# Patient Record
Sex: FEMALE | Race: BLACK OR AFRICAN AMERICAN | NOT HISPANIC OR LATINO | Employment: FULL TIME | URBAN - METROPOLITAN AREA
[De-identification: names, ages, dates, MRNs, and addresses within clinical notes are randomized per-mention and may not be internally consistent; named-entity substitution may affect disease eponyms.]

---

## 2024-06-21 ENCOUNTER — APPOINTMENT (OUTPATIENT)
Dept: PRIMARY CARE | Facility: CLINIC | Age: 69
End: 2024-06-21
Payer: MEDICARE

## 2024-06-21 ENCOUNTER — HOSPITAL ENCOUNTER (OUTPATIENT)
Dept: RADIOLOGY | Facility: HOSPITAL | Age: 69
Discharge: HOME | End: 2024-06-21
Payer: MEDICARE

## 2024-06-21 ENCOUNTER — LAB (OUTPATIENT)
Dept: LAB | Facility: LAB | Age: 69
End: 2024-06-21
Payer: MEDICARE

## 2024-06-21 VITALS
OXYGEN SATURATION: 99 % | HEIGHT: 62 IN | WEIGHT: 293 LBS | SYSTOLIC BLOOD PRESSURE: 147 MMHG | DIASTOLIC BLOOD PRESSURE: 88 MMHG | BODY MASS INDEX: 53.92 KG/M2 | HEART RATE: 72 BPM | RESPIRATION RATE: 22 BRPM

## 2024-06-21 DIAGNOSIS — R42 DIZZINESS: ICD-10-CM

## 2024-06-21 DIAGNOSIS — R90.89 ABNORMAL BRAIN CT: ICD-10-CM

## 2024-06-21 DIAGNOSIS — R31.9 HEMATURIA, UNSPECIFIED TYPE: ICD-10-CM
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
## 2024-06-21 DIAGNOSIS — Z12.11 SCREENING FOR COLORECTAL CANCER: ICD-10-CM

## 2024-06-21 DIAGNOSIS — N93.9 ABNORMAL UTERINE BLEEDING: ICD-10-CM

## 2024-06-21 DIAGNOSIS — Z76.89 ENCOUNTER TO ESTABLISH CARE: Primary | ICD-10-CM

## 2024-06-21 DIAGNOSIS — Z76.89 ENCOUNTER TO ESTABLISH CARE: ICD-10-CM

## 2024-06-21 DIAGNOSIS — Z12.31 SCREENING MAMMOGRAM FOR BREAST CANCER: ICD-10-CM

## 2024-06-21 DIAGNOSIS — S06.0X0A BRAIN CONCUSSION, WITHOUT LOSS OF CONSCIOUSNESS, INITIAL ENCOUNTER: ICD-10-CM

## 2024-06-21 DIAGNOSIS — R03.0 ELEVATED BP WITHOUT DIAGNOSIS OF HYPERTENSION: ICD-10-CM

## 2024-06-21 DIAGNOSIS — J01.00 ACUTE NON-RECURRENT MAXILLARY SINUSITIS: ICD-10-CM

## 2024-06-21 DIAGNOSIS — Z12.12 SCREENING FOR COLORECTAL CANCER: ICD-10-CM

## 2024-06-21 PROBLEM — E66.813 CLASS 3 SEVERE OBESITY DUE TO EXCESS CALORIES WITH SERIOUS COMORBIDITY IN ADULT: Status: ACTIVE | Noted: 2018-05-07

## 2024-06-21 PROBLEM — E44.1 MALNUTRITION OF MILD DEGREE (MULTI): Status: ACTIVE | Noted: 2022-05-26

## 2024-06-21 PROBLEM — E66.01 CLASS 3 SEVERE OBESITY DUE TO EXCESS CALORIES WITH SERIOUS COMORBIDITY IN ADULT (MULTI): Status: ACTIVE | Noted: 2018-05-07

## 2024-06-21 PROBLEM — K80.00 CALCULUS OF GALLBLADDER WITH ACUTE CHOLECYSTITIS WITHOUT OBSTRUCTION: Status: ACTIVE | Noted: 2022-04-26

## 2024-06-21 PROBLEM — K80.00 ACUTE CHOLECYSTITIS DUE TO BILIARY CALCULUS: Status: ACTIVE | Noted: 2022-05-25

## 2024-06-21 PROBLEM — S06.0XAA BRAIN CONCUSSION: Status: ACTIVE | Noted: 2024-06-21

## 2024-06-21 LAB
ALBUMIN SERPL BCP-MCNC: 3.8 G/DL (ref 3.4–5)
ALP SERPL-CCNC: 110 U/L (ref 33–136)
ALT SERPL W P-5'-P-CCNC: 12 U/L (ref 7–45)
ANION GAP SERPL CALC-SCNC: 11 MMOL/L (ref 10–20)
APPEARANCE UR: ABNORMAL
AST SERPL W P-5'-P-CCNC: 14 U/L (ref 9–39)
BACTERIA #/AREA URNS AUTO: ABNORMAL /HPF
BASOPHILS # BLD AUTO: 0.03 X10*3/UL (ref 0–0.1)
BASOPHILS NFR BLD AUTO: 0.4 %
BILIRUB SERPL-MCNC: 1.1 MG/DL (ref 0–1.2)
BILIRUB UR STRIP.AUTO-MCNC: NEGATIVE MG/DL
BUN SERPL-MCNC: 12 MG/DL (ref 6–23)
CALCIUM SERPL-MCNC: 8.8 MG/DL (ref 8.6–10.3)
CHLORIDE SERPL-SCNC: 107 MMOL/L (ref 98–107)
CHOLEST SERPL-MCNC: 207 MG/DL (ref 0–199)
CHOLESTEROL/HDL RATIO: 4.8
CO2 SERPL-SCNC: 26 MMOL/L (ref 21–32)
COLOR UR: YELLOW
CREAT SERPL-MCNC: 0.8 MG/DL (ref 0.5–1.05)
EGFRCR SERPLBLD CKD-EPI 2021: 81 ML/MIN/1.73M*2
EOSINOPHIL # BLD AUTO: 0.17 X10*3/UL (ref 0–0.7)
EOSINOPHIL NFR BLD AUTO: 2 %
ERYTHROCYTE [DISTWIDTH] IN BLOOD BY AUTOMATED COUNT: 14.3 % (ref 11.5–14.5)
EST. AVERAGE GLUCOSE BLD GHB EST-MCNC: 120 MG/DL
GLUCOSE SERPL-MCNC: 93 MG/DL (ref 74–99)
GLUCOSE UR STRIP.AUTO-MCNC: NORMAL MG/DL
HBA1C MFR BLD: 5.8 %
HCT VFR BLD AUTO: 41 % (ref 36–46)
HDLC SERPL-MCNC: 43.3 MG/DL
HGB BLD-MCNC: 13 G/DL (ref 12–16)
IMM GRANULOCYTES # BLD AUTO: 0.04 X10*3/UL (ref 0–0.7)
IMM GRANULOCYTES NFR BLD AUTO: 0.5 % (ref 0–0.9)
KETONES UR STRIP.AUTO-MCNC: ABNORMAL MG/DL
LEUKOCYTE ESTERASE UR QL STRIP.AUTO: ABNORMAL
LYMPHOCYTES # BLD AUTO: 2.26 X10*3/UL (ref 1.2–4.8)
LYMPHOCYTES NFR BLD AUTO: 26.5 %
MCH RBC QN AUTO: 31.2 PG (ref 26–34)
MCHC RBC AUTO-ENTMCNC: 31.7 G/DL (ref 32–36)
MCV RBC AUTO: 98 FL (ref 80–100)
MONOCYTES # BLD AUTO: 0.61 X10*3/UL (ref 0.1–1)
MONOCYTES NFR BLD AUTO: 7.2 %
MUCOUS THREADS #/AREA URNS AUTO: ABNORMAL /LPF
NEUTROPHILS # BLD AUTO: 5.42 X10*3/UL (ref 1.2–7.7)
NEUTROPHILS NFR BLD AUTO: 63.4 %
NITRITE UR QL STRIP.AUTO: NEGATIVE
NON-HDL CHOLESTEROL: 164 MG/DL (ref 0–149)
NRBC BLD-RTO: 0 /100 WBCS (ref 0–0)
PH UR STRIP.AUTO: 5 [PH]
PLATELET # BLD AUTO: 320 X10*3/UL (ref 150–450)
POTASSIUM SERPL-SCNC: 4.1 MMOL/L (ref 3.5–5.3)
PROT SERPL-MCNC: 7.1 G/DL (ref 6.4–8.2)
PROT UR STRIP.AUTO-MCNC: ABNORMAL MG/DL
RBC # BLD AUTO: 4.17 X10*6/UL (ref 4–5.2)
RBC # UR STRIP.AUTO: ABNORMAL /UL
RBC #/AREA URNS AUTO: ABNORMAL /HPF
SODIUM SERPL-SCNC: 140 MMOL/L (ref 136–145)
SP GR UR STRIP.AUTO: 1.02
SQUAMOUS #/AREA URNS AUTO: ABNORMAL /HPF
TSH SERPL-ACNC: 2 MIU/L (ref 0.44–3.98)
UROBILINOGEN UR STRIP.AUTO-MCNC: NORMAL MG/DL
WBC # BLD AUTO: 8.5 X10*3/UL (ref 4.4–11.3)
WBC #/AREA URNS AUTO: ABNORMAL /HPF

## 2024-06-21 PROCEDURE — 80053 COMPREHEN METABOLIC PANEL: CPT

## 2024-06-21 PROCEDURE — 81001 URINALYSIS AUTO W/SCOPE: CPT

## 2024-06-21 PROCEDURE — 1159F MED LIST DOCD IN RCRD: CPT

## 2024-06-21 PROCEDURE — 83718 ASSAY OF LIPOPROTEIN: CPT

## 2024-06-21 PROCEDURE — 85025 COMPLETE CBC W/AUTO DIFF WBC: CPT

## 2024-06-21 PROCEDURE — 82465 ASSAY BLD/SERUM CHOLESTEROL: CPT

## 2024-06-21 PROCEDURE — 3008F BODY MASS INDEX DOCD: CPT

## 2024-06-21 PROCEDURE — 1036F TOBACCO NON-USER: CPT

## 2024-06-21 PROCEDURE — 70150 X-RAY EXAM OF FACIAL BONES: CPT

## 2024-06-21 PROCEDURE — 70450 CT HEAD/BRAIN W/O DYE: CPT

## 2024-06-21 PROCEDURE — 1170F FXNL STATUS ASSESSED: CPT

## 2024-06-21 PROCEDURE — 84443 ASSAY THYROID STIM HORMONE: CPT

## 2024-06-21 PROCEDURE — 99205 OFFICE O/P NEW HI 60 MIN: CPT

## 2024-06-21 RX ORDER — CIPROFLOXACIN 500 MG/1
500 TABLET ORAL 2 TIMES DAILY
Qty: 10 TABLET | Refills: 0 | Status: SHIPPED | OUTPATIENT
Start: 2024-06-21 | End: 2024-06-26

## 2024-06-21 ASSESSMENT — ENCOUNTER SYMPTOMS
FEVER: 0
PALPITATIONS: 0
RECTAL PAIN: 0
HEADACHES: 1
SINUS PAIN: 1
CARDIOVASCULAR NEGATIVE: 1
DEPRESSION: 0
CONSTITUTIONAL NEGATIVE: 1
DIFFICULTY URINATING: 0
UNEXPECTED WEIGHT CHANGE: 0
TREMORS: 0
VOICE CHANGE: 0
RESPIRATORY NEGATIVE: 1
CONSTIPATION: 0
HEMATURIA: 0
BACK PAIN: 0
ABDOMINAL DISTENTION: 0
FATIGUE: 0
ENDOCRINE NEGATIVE: 1
ACTIVITY CHANGE: 0
CHILLS: 0
DYSPHORIC MOOD: 0
LOSS OF SENSATION IN FEET: 0
SPEECH DIFFICULTY: 0
COUGH: 0
JOINT SWELLING: 0
RHINORRHEA: 0
EYE DISCHARGE: 0
SHORTNESS OF BREATH: 0
FACIAL ASYMMETRY: 0
OCCASIONAL FEELINGS OF UNSTEADINESS: 1
AGITATION: 0
SEIZURES: 0
EYES NEGATIVE: 1
FREQUENCY: 0
COLOR CHANGE: 0
NAUSEA: 0
SINUS PRESSURE: 1
WEAKNESS: 0
GASTROINTESTINAL NEGATIVE: 1
APPETITE CHANGE: 0
POLYDIPSIA: 0
PSYCHIATRIC NEGATIVE: 1
PHOTOPHOBIA: 0
NECK PAIN: 0
CHEST TIGHTNESS: 0
FLANK PAIN: 0
ABDOMINAL PAIN: 0
HYPERACTIVE: 0
WHEEZING: 0
DIARRHEA: 0
BLOOD IN STOOL: 0
SLEEP DISTURBANCE: 0
DYSURIA: 0
BRUISES/BLEEDS EASILY: 0
STRIDOR: 0
HEMATOLOGIC/LYMPHATIC NEGATIVE: 1
NUMBNESS: 0
APNEA: 0
NECK STIFFNESS: 0
ANAL BLEEDING: 0
DIAPHORESIS: 0
VOMITING: 0
POLYPHAGIA: 0
TROUBLE SWALLOWING: 0
LIGHT-HEADEDNESS: 0
CONFUSION: 0
ARTHRALGIAS: 1
SORE THROAT: 0
DIZZINESS: 1
NERVOUS/ANXIOUS: 0
MYALGIAS: 0

## 2024-06-21 ASSESSMENT — ACTIVITIES OF DAILY LIVING (ADL)
JUDGMENT_ADEQUATE_SAFELY_COMPLETE_DAILY_ACTIVITIES: YES
GROOMING: INDEPENDENT
WALKS IN HOME: INDEPENDENT
PATIENT'S MEMORY ADEQUATE TO SAFELY COMPLETE DAILY ACTIVITIES?: YES
HEARING - RIGHT EAR: FUNCTIONAL
DRESSING YOURSELF: INDEPENDENT
FEEDING YOURSELF: INDEPENDENT
HEARING - LEFT EAR: FUNCTIONAL
TOILETING: INDEPENDENT
BATHING: INDEPENDENT
ADEQUATE_TO_COMPLETE_ADL: YES

## 2024-06-21 ASSESSMENT — PATIENT HEALTH QUESTIONNAIRE - PHQ9
SUM OF ALL RESPONSES TO PHQ9 QUESTIONS 1 AND 2: 0
1. LITTLE INTEREST OR PLEASURE IN DOING THINGS: NOT AT ALL
2. FEELING DOWN, DEPRESSED OR HOPELESS: NOT AT ALL

## 2024-06-21 NOTE — PROGRESS NOTES
Primary Care Provider: Marleni Espinoza, APRN-CNP    Subjective   Flaquita Watson is a 69 y.o. female who presents for Establish Care and Vaginal Bleeding.    NPV/ est care    PMH:  BMI 60  Hx of CHOLECYSTOSTOMY    Has been having vaginal bleeding/spotting for the last month  Last pap test she thinks was about 11 years ago  Fell 1.5 weeks ago, she reports she was walking and at the time was wearing a long dress and tripped over her dress and fell; she reports she fell and hit the right side of her body and her head; denies any loss of consciousness ; sinus pressure and pain  No vision changes, no blurry vision, no nausea, no vomiting, no numbness, no tingling, no weakness, no memory changes  + HA and dizziness since she fell- Reports that she has never had a headache so this is not normal for her  No blood thinners  No chest pain, no SOB, no racing HR  Arthralgias   Mammogram due  Colorectal cancer screening due- declines colonoscopy         Review of Systems   Constitutional: Negative.  Negative for activity change, appetite change, chills, diaphoresis, fatigue, fever and unexpected weight change.   HENT:  Positive for sinus pressure and sinus pain. Negative for congestion, dental problem, ear discharge, ear pain, hearing loss, mouth sores, nosebleeds, postnasal drip, rhinorrhea, sneezing, sore throat, tinnitus, trouble swallowing and voice change.    Eyes: Negative.  Negative for photophobia, discharge and visual disturbance.   Respiratory: Negative.  Negative for apnea, cough, chest tightness, shortness of breath, wheezing and stridor.    Cardiovascular: Negative.  Negative for chest pain, palpitations and leg swelling.   Gastrointestinal: Negative.  Negative for abdominal distention, abdominal pain, anal bleeding, blood in stool, constipation, diarrhea, nausea, rectal pain and vomiting.   Endocrine: Negative.  Negative for cold intolerance, heat intolerance, polydipsia, polyphagia and polyuria.   Genitourinary:  "Negative.  Negative for decreased urine volume, difficulty urinating, dysuria, flank pain, frequency, hematuria and urgency.   Musculoskeletal:  Positive for arthralgias. Negative for back pain, gait problem, joint swelling, myalgias, neck pain and neck stiffness.   Skin: Negative.  Negative for color change and rash.   Neurological:  Positive for dizziness and headaches. Negative for tremors, seizures, syncope, facial asymmetry, speech difficulty, weakness, light-headedness and numbness.   Hematological: Negative.  Does not bruise/bleed easily.   Psychiatric/Behavioral: Negative.  Negative for agitation, confusion, dysphoric mood, sleep disturbance and suicidal ideas. The patient is not nervous/anxious and is not hyperactive.    All other systems reviewed and are negative.        Objective   /88 (BP Location: Right arm, Patient Position: Sitting, BP Cuff Size: Adult)   Pulse 72   Resp 22   Ht 1.585 m (5' 2.4\")   Wt (!) 153 kg (337 lb 9.6 oz)   SpO2 99%   BMI 60.95 kg/m²     Physical Exam  Vitals reviewed.   Constitutional:       General: She is not in acute distress.     Appearance: Normal appearance. She is obese. She is not ill-appearing, toxic-appearing or diaphoretic.   HENT:      Head: Normocephalic and atraumatic.      Nose: Nose normal.   Eyes:      Extraocular Movements: Extraocular movements intact.      Conjunctiva/sclera: Conjunctivae normal.      Pupils: Pupils are equal, round, and reactive to light.   Cardiovascular:      Rate and Rhythm: Normal rate and regular rhythm.      Pulses: Normal pulses.      Heart sounds: Normal heart sounds. No murmur heard.     No friction rub. No gallop.   Pulmonary:      Effort: Pulmonary effort is normal. No respiratory distress.      Breath sounds: Normal breath sounds.   Abdominal:      General: Abdomen is flat. Bowel sounds are normal.      Palpations: Abdomen is soft.   Musculoskeletal:         General: Normal range of motion.      Cervical back: Normal " range of motion and neck supple.   Lymphadenopathy:      Cervical: No cervical adenopathy.   Skin:     General: Skin is warm and dry.      Capillary Refill: Capillary refill takes less than 2 seconds.   Neurological:      General: No focal deficit present.      Mental Status: She is alert and oriented to person, place, and time. Mental status is at baseline.   Psychiatric:         Mood and Affect: Mood normal.         Behavior: Behavior normal.         Thought Content: Thought content normal.         Judgment: Judgment normal.         Assessment/Plan   Problem List Items Addressed This Visit    NPV/ est care   Hematuria    new  Relevant Orders    Urinalysis with Reflex Microscopic    Urine Culture    CBC and Auto Differential    Comprehensive metabolic panel    Abnormal uterine bleeding    new  Relevant Orders    US pelvis transvaginal    TSH with reflex to Free T4 if abnormal    Urinalysis with Reflex Microscopic    Urine Culture    CBC and Auto Differential    Comprehensive metabolic panel    Hemoglobin A1C    Lipid Panel Non-Fasting    Referral to Obstetrics / Gynecology    Encounter to establish care - Primary    Relevant Orders    TSH with reflex to Free T4 if abnormal    Urinalysis with Reflex Microscopic    Urine Culture    CBC and Auto Differential    Comprehensive metabolic panel    Hemoglobin A1C    Lipid Panel Non-Fasting    Referral to Obstetrics / Gynecology    BMI 60.0-69.9, adult (Multi)    Work on healthier eating and start walking  Relevant Orders    TSH with reflex to Free T4 if abnormal    Urinalysis with Reflex Microscopic    Urine Culture    CBC and Auto Differential    Comprehensive metabolic panel    Hemoglobin A1C    Lipid Panel Non-Fasting    Brain concussion    Relevant Orders    CT head wo IV contrast    XR facial bones 1-2 views    Elevated BP without diagnosis of hypertension  New  Check BP a few times a week  Low sodium diet    Dizziness    Relevant Orders    CT head wo IV contrast     XR facial bones 1-2 views    Fall    Relevant Orders    CT head wo IV contrast    XR facial bones 1-2 views     Due to her weight and her reported symptoms would like to complete a CT head without contrast and XR of her facial bones.     Patient was identified as a fall risk. Risk prevention instructions provided.    Discussed red flags and when to go to the ER or call 911    Follow up in 2 weeks or sooner if needed      Addendum:  I called patient and left a voicemail letting her know there was nothing acute and that I wanted her to start on an antibiotic to cover for sinus infection and UTI- the urine culture is still pending. No fractures or bleeding seen on the CT scan. There was some changes on her CT head that should be looked at closer- unsure sure if the changes are part of the aging process or something else. I ordered a MRI of her brain, she can complete this at her convenience. Waiting on A1c and urine culture to result- some blood in the urine. Referral to urology is urine culture comes back normal. Can complete the abx for acute sinusitis seen on CT.

## 2024-06-24 ENCOUNTER — LAB (OUTPATIENT)
Dept: LAB | Facility: LAB | Age: 69
End: 2024-06-24
Payer: MEDICARE

## 2024-06-24 ENCOUNTER — TELEPHONE (OUTPATIENT)
Dept: PRIMARY CARE | Facility: CLINIC | Age: 69
End: 2024-06-24
Payer: MEDICARE

## 2024-06-24 DIAGNOSIS — R31.9 HEMATURIA, UNSPECIFIED TYPE: ICD-10-CM

## 2024-06-24 DIAGNOSIS — Z76.89 ENCOUNTER TO ESTABLISH CARE: ICD-10-CM

## 2024-06-24 DIAGNOSIS — N93.9 ABNORMAL UTERINE BLEEDING: ICD-10-CM

## 2024-06-24 PROCEDURE — 87086 URINE CULTURE/COLONY COUNT: CPT

## 2024-06-24 NOTE — TELEPHONE ENCOUNTER
Per Marleni Espinoza, APRN-CNP... Informed Patient there was nothing acute and that I wanted her to start on an antibiotic to cover for sinus infection and UTI- the urine culture is still pending.     make sure she was able to take the antibiotic and let her know that there was no fractures or bleeding seen on the CT scan. There was some changes on her CT head that should be looked at closer- unsure sure if the changes are part of the aging process or something else. I ordered a MRI of her brain, she can complete this at her convenience. Waiting on A1c and urine culture to result- some blood in the urine. Referral to urology is urine culture comes back normal. Can complete the abx for acute sinusitis seen on CT. Elevated cholesterol, start walking, increase fiber in diet, cut back on red meats, sweets, and cheese. Please schedule a 2 week follow up apt    Pt understood.

## 2024-06-26 ENCOUNTER — HOSPITAL ENCOUNTER (OUTPATIENT)
Dept: RADIOLOGY | Facility: CLINIC | Age: 69
Discharge: HOME | End: 2024-06-26
Payer: MEDICARE

## 2024-06-26 DIAGNOSIS — N93.9 ABNORMAL UTERINE BLEEDING: ICD-10-CM

## 2024-06-26 LAB — BACTERIA UR CULT: NO GROWTH

## 2024-06-26 PROCEDURE — 76856 US EXAM PELVIC COMPLETE: CPT

## 2024-06-26 PROCEDURE — 76830 TRANSVAGINAL US NON-OB: CPT | Performed by: RADIOLOGY

## 2024-06-26 PROCEDURE — 76856 US EXAM PELVIC COMPLETE: CPT | Performed by: RADIOLOGY

## 2024-06-28 DIAGNOSIS — N93.9 ABNORMAL UTERINE BLEEDING: ICD-10-CM

## 2024-06-28 DIAGNOSIS — R93.89 ENDOMETRIAL THICKENING ON ULTRASOUND: Primary | ICD-10-CM

## 2024-07-06 ENCOUNTER — HOSPITAL ENCOUNTER (OUTPATIENT)
Dept: RADIOLOGY | Facility: HOSPITAL | Age: 69
Discharge: HOME | End: 2024-07-06
Payer: MEDICARE

## 2024-07-06 VITALS — WEIGHT: 293 LBS | HEIGHT: 61 IN | BODY MASS INDEX: 55.32 KG/M2

## 2024-07-06 DIAGNOSIS — Z12.31 SCREENING MAMMOGRAM FOR BREAST CANCER: ICD-10-CM

## 2024-07-06 PROCEDURE — 77067 SCR MAMMO BI INCL CAD: CPT

## 2024-07-06 PROCEDURE — 77067 SCR MAMMO BI INCL CAD: CPT | Performed by: STUDENT IN AN ORGANIZED HEALTH CARE EDUCATION/TRAINING PROGRAM

## 2024-07-06 PROCEDURE — 77063 BREAST TOMOSYNTHESIS BI: CPT | Performed by: STUDENT IN AN ORGANIZED HEALTH CARE EDUCATION/TRAINING PROGRAM

## 2024-07-09 ENCOUNTER — HOSPITAL ENCOUNTER (OUTPATIENT)
Dept: RADIOLOGY | Facility: EXTERNAL LOCATION | Age: 69
Discharge: HOME | End: 2024-07-09

## 2024-07-10 LAB — NONINV COLON CA DNA+OCC BLD SCRN STL QL: NEGATIVE

## 2024-07-11 ENCOUNTER — APPOINTMENT (OUTPATIENT)
Dept: RADIOLOGY | Facility: HOSPITAL | Age: 69
End: 2024-07-11
Payer: MEDICARE

## 2024-07-18 ENCOUNTER — OFFICE VISIT (OUTPATIENT)
Dept: OBSTETRICS AND GYNECOLOGY | Facility: CLINIC | Age: 69
End: 2024-07-18
Payer: MEDICARE

## 2024-07-18 VITALS
SYSTOLIC BLOOD PRESSURE: 122 MMHG | HEIGHT: 64 IN | DIASTOLIC BLOOD PRESSURE: 84 MMHG | BODY MASS INDEX: 50.02 KG/M2 | WEIGHT: 293 LBS

## 2024-07-18 DIAGNOSIS — R93.89 ENDOMETRIAL THICKENING ON ULTRASOUND: ICD-10-CM

## 2024-07-18 DIAGNOSIS — N93.9 ABNORMAL UTERINE BLEEDING: ICD-10-CM

## 2024-07-18 PROCEDURE — 3008F BODY MASS INDEX DOCD: CPT | Performed by: OBSTETRICS & GYNECOLOGY

## 2024-07-18 PROCEDURE — 1160F RVW MEDS BY RX/DR IN RCRD: CPT | Performed by: OBSTETRICS & GYNECOLOGY

## 2024-07-18 PROCEDURE — 1036F TOBACCO NON-USER: CPT | Performed by: OBSTETRICS & GYNECOLOGY

## 2024-07-18 PROCEDURE — 99204 OFFICE O/P NEW MOD 45 MIN: CPT | Performed by: OBSTETRICS & GYNECOLOGY

## 2024-07-18 PROCEDURE — 87624 HPV HI-RISK TYP POOLED RSLT: CPT | Performed by: OBSTETRICS & GYNECOLOGY

## 2024-07-18 PROCEDURE — 1159F MED LIST DOCD IN RCRD: CPT | Performed by: OBSTETRICS & GYNECOLOGY

## 2024-07-18 PROCEDURE — 1126F AMNT PAIN NOTED NONE PRSNT: CPT | Performed by: OBSTETRICS & GYNECOLOGY

## 2024-07-18 PROCEDURE — 99214 OFFICE O/P EST MOD 30 MIN: CPT | Performed by: OBSTETRICS & GYNECOLOGY

## 2024-07-18 RX ORDER — MISOPROSTOL 200 UG/1
200 TABLET ORAL ONCE
Qty: 1 TABLET | Refills: 0 | Status: SHIPPED | OUTPATIENT
Start: 2024-07-18 | End: 2024-07-18

## 2024-07-18 ASSESSMENT — PAIN SCALES - GENERAL: PAINLEVEL: 0-NO PAIN

## 2024-07-18 ASSESSMENT — PATIENT HEALTH QUESTIONNAIRE - PHQ9
SUM OF ALL RESPONSES TO PHQ9 QUESTIONS 1 AND 2: 0
2. FEELING DOWN, DEPRESSED OR HOPELESS: NOT AT ALL
1. LITTLE INTEREST OR PLEASURE IN DOING THINGS: NOT AT ALL

## 2024-07-18 ASSESSMENT — ENCOUNTER SYMPTOMS
LOSS OF SENSATION IN FEET: 0
OCCASIONAL FEELINGS OF UNSTEADINESS: 0
DEPRESSION: 0

## 2024-07-18 NOTE — PROGRESS NOTES
Flaquita Watson is a 69 y.o. female,  who presented with  AUB    Spotting   No Cramping  No Pressure      Family history of breast and bladder cancer     US : increase endometrial thickness  1. Abnormally thickened endometrium measuring up to 2 cm. Further  gynecologic workup is advised.  2. Bilateral ovaries are not visualized. No adnexal masses are seen.      Obstetrical History:  OB History          2    Para   2    Term   2            AB        Living             SAB        IAB        Ectopic        Multiple        Live Births                   Vaginal        Gynecological history:  Menarche: 15   years old   Periods menopause            Vaginal discharge    No  Menopausal symptoms No        Last Pap:  History of abnormal pap exams? Yes      History reviewed. No pertinent past medical history.  History reviewed. No pertinent surgical history.  Family History   Problem Relation Name Age of Onset    COPD Mother      Stomach cancer Mother      Other (ESRD) Mother      Breast cancer Mother  46    Hypertension Mother      Thyroid disease Father      Heart failure Father      Other (Bladder Cancer) Sister  52    Diabetes type II Daughter       Social History     Tobacco Use    Smoking status: Never    Smokeless tobacco: Never   Vaping Use    Vaping status: Never Used   Substance Use Topics    Alcohol use: Never    Drug use: Never     Social History     Tobacco Use   Smoking Status Never   Smokeless Tobacco Never       Current Outpatient Medications on File Prior to Visit   Medication Sig Dispense Refill    albuterol 90 mcg/actuation inhaler Inhale 2 puffs every 4 hours if needed.      fluticasone (Flonase) 50 mcg/actuation nasal spray SPRAY 1 SPRAY INTO EACH NOSTRIL AT BEDTIME      [DISCONTINUED] methylPREDNISolone (Medrol Dospak) 4 mg tablets TAKE 6 TABLETS ON DAY 1 AS DIRECTED ON PACKAGE AND DECREASE BY 1 TAB EACH DAY FOR A TOTAL OF 6 DAYS       No current facility-administered medications on file  "prior to visit.     Allergies   Allergen Reactions    Iodine Itching    Penicillins Other    Sulfa (Sulfonamide Antibiotics) Other         REVIEW OF SYSTEMS:    General: No weight loss, malaise or fevers or other constitutional symptoms.  Neuro: No history of TIA's, stroke,.  No neurological symptoms or problems. No visual changes  Respiratory: No history of respiratory/pulmonary symptoms or problems.  Cardiovascular: No history of cardiovascular symptoms or problems.  GI: No history of GI symptoms or problems.   : No history of UTI in past 6 weeks.  No history of  symptoms or problems.  BREASTS: No skin dimpling, nipple discharge or masses.  Endocrine: No history of diabetes. No Thyroid symptoms  Hematology: No history of bleeding or clotting disorder.  Skin: Negative for lesions, rash, and itching.      PHYSICAL EXAMINATION:    /84   Ht 1.619 m (5' 3.75\")   Wt (!) 153 kg (337 lb 12.8 oz)   BMI 58.44 kg/m²   GENERAL: pleasant, female in no apparent distress  HEENT: Normocephalic, atraumatic, mucus membranes moist and no lesions  NEURO: alert and oriented x3,exam grossly non-focal  NECK: Supple, full range of motion, no adenopathy and thyroid normal  DERMATOLOGY: Normal, without lesions, non-icteric and non-hirsute       ABDOMEN: soft, non-tender and no masses  PELVIC: external genitalia atrophic , normal Bartholin's glands, urethra, [place]'s glands, no vulvar lesions, no cervical lesions, good vaginal support, physiologic discharge present, normal appearing perineal body and perianal region  BIMANUAL: uterus normal size, shape and consistency, no adnexal masses and non-tender        ASSESSMENT and Plan:    Flaquita Watson is a 69 y.o. female,  who  presented with postmenopausal bleeding     US abnormally thick lining     I did her pap today   Highly recommended biopsy given her BMI , and Family history   Discussed eithier in the office or OR   She wants to think about it first          Noemi LAYTON" MD [place]

## 2024-07-19 ENCOUNTER — APPOINTMENT (OUTPATIENT)
Dept: PRIMARY CARE | Facility: CLINIC | Age: 69
End: 2024-07-19
Payer: MEDICARE

## 2024-07-19 ENCOUNTER — LAB (OUTPATIENT)
Dept: LAB | Facility: LAB | Age: 69
End: 2024-07-19
Payer: MEDICARE

## 2024-07-19 VITALS
SYSTOLIC BLOOD PRESSURE: 122 MMHG | RESPIRATION RATE: 19 BRPM | WEIGHT: 293 LBS | HEART RATE: 72 BPM | BODY MASS INDEX: 50.02 KG/M2 | HEIGHT: 64 IN | DIASTOLIC BLOOD PRESSURE: 62 MMHG | OXYGEN SATURATION: 96 %

## 2024-07-19 DIAGNOSIS — M75.02 ADHESIVE CAPSULITIS OF LEFT SHOULDER: ICD-10-CM

## 2024-07-19 DIAGNOSIS — M25.561 CHRONIC PAIN OF BOTH KNEES: ICD-10-CM

## 2024-07-19 DIAGNOSIS — E66.01 CLASS 3 SEVERE OBESITY DUE TO EXCESS CALORIES WITH SERIOUS COMORBIDITY AND BODY MASS INDEX (BMI) OF 50.0 TO 59.9 IN ADULT (MULTI): ICD-10-CM

## 2024-07-19 DIAGNOSIS — R31.21 ASYMPTOMATIC MICROSCOPIC HEMATURIA: ICD-10-CM

## 2024-07-19 DIAGNOSIS — Z00.00 ENCOUNTER FOR MEDICARE ANNUAL WELLNESS EXAM: Primary | ICD-10-CM

## 2024-07-19 DIAGNOSIS — G89.29 CHRONIC PAIN OF BOTH KNEES: ICD-10-CM

## 2024-07-19 DIAGNOSIS — I72.2 RENAL ARTERY ANEURYSM (CMS-HCC): ICD-10-CM

## 2024-07-19 DIAGNOSIS — M25.562 CHRONIC PAIN OF BOTH KNEES: ICD-10-CM

## 2024-07-19 DIAGNOSIS — K43.9 VENTRAL HERNIA WITHOUT OBSTRUCTION OR GANGRENE: ICD-10-CM

## 2024-07-19 DIAGNOSIS — E78.2 MIXED HYPERLIPIDEMIA: ICD-10-CM
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
## 2024-07-19 PROBLEM — R42 DIZZINESS: Status: RESOLVED | Noted: 2024-06-21 | Resolved: 2024-07-19

## 2024-07-19 PROBLEM — E44.1 MALNUTRITION OF MILD DEGREE (MULTI): Status: RESOLVED | Noted: 2022-05-26 | Resolved: 2024-07-19

## 2024-07-19 PROBLEM — Z76.89 ENCOUNTER TO ESTABLISH CARE: Status: RESOLVED | Noted: 2024-06-21 | Resolved: 2024-07-19

## 2024-07-19 LAB
APPEARANCE UR: CLEAR
BILIRUB UR STRIP.AUTO-MCNC: NEGATIVE MG/DL
COLOR UR: NORMAL
GLUCOSE UR STRIP.AUTO-MCNC: NORMAL MG/DL
KETONES UR STRIP.AUTO-MCNC: NEGATIVE MG/DL
LEUKOCYTE ESTERASE UR QL STRIP.AUTO: NEGATIVE
NITRITE UR QL STRIP.AUTO: NEGATIVE
PH UR STRIP.AUTO: 5.5 [PH]
PROT UR STRIP.AUTO-MCNC: NEGATIVE MG/DL
RBC # UR STRIP.AUTO: NEGATIVE /UL
SP GR UR STRIP.AUTO: 1.02
UROBILINOGEN UR STRIP.AUTO-MCNC: NORMAL MG/DL

## 2024-07-19 PROCEDURE — 1160F RVW MEDS BY RX/DR IN RCRD: CPT

## 2024-07-19 PROCEDURE — 81003 URINALYSIS AUTO W/O SCOPE: CPT

## 2024-07-19 PROCEDURE — 1159F MED LIST DOCD IN RCRD: CPT

## 2024-07-19 PROCEDURE — 1170F FXNL STATUS ASSESSED: CPT

## 2024-07-19 PROCEDURE — G0439 PPPS, SUBSEQ VISIT: HCPCS

## 2024-07-19 PROCEDURE — 1123F ACP DISCUSS/DSCN MKR DOCD: CPT

## 2024-07-19 PROCEDURE — 3008F BODY MASS INDEX DOCD: CPT

## 2024-07-19 PROCEDURE — 1158F ADVNC CARE PLAN TLK DOCD: CPT

## 2024-07-19 ASSESSMENT — ENCOUNTER SYMPTOMS
DEPRESSION: 0
LOSS OF SENSATION IN FEET: 0
OCCASIONAL FEELINGS OF UNSTEADINESS: 0

## 2024-07-19 ASSESSMENT — PATIENT HEALTH QUESTIONNAIRE - PHQ9
1. LITTLE INTEREST OR PLEASURE IN DOING THINGS: NOT AT ALL
2. FEELING DOWN, DEPRESSED OR HOPELESS: NOT AT ALL
1. LITTLE INTEREST OR PLEASURE IN DOING THINGS: NOT AT ALL
2. FEELING DOWN, DEPRESSED OR HOPELESS: NOT AT ALL
SUM OF ALL RESPONSES TO PHQ9 QUESTIONS 1 AND 2: 0
SUM OF ALL RESPONSES TO PHQ9 QUESTIONS 1 AND 2: 0

## 2024-07-19 ASSESSMENT — ACTIVITIES OF DAILY LIVING (ADL)
GROCERY_SHOPPING: INDEPENDENT
BATHING: INDEPENDENT
DRESSING: INDEPENDENT
DOING_HOUSEWORK: INDEPENDENT
MANAGING_FINANCES: INDEPENDENT

## 2024-07-19 NOTE — PROGRESS NOTES
Primary Care Provider: MITRA Khoury    Chief Complaint: Medicare Wellness Exam/Comprehensive Problem Focused Follow Up and Physical Exam    HPI:    MWE    Has concerns of left shoulder pain  very limited ROM; should pain & tenderness     Chronic pain both knees    Hx of renal artery aneurysm  Lasted viewed on CT A/P 7/1/22: Incidentally noted small rim calcified splenic artery aneurysm, left renal artery aneurysm and right renal artery aneurysm. Bilateral peripelvic renal cysts.       Obesity; BMI 58.30    HLD    Ventral hernia    Asymptomatic hematuria  Previous UA with [facility]- daughter Kajal Watson      No chest pain, no shortness of breath, bowel movements regular, no trouble urinating, energy level is good    Active Problem List  Patient Active Problem List   Diagnosis    Abnormal mammogram    Acute cholecystitis due to biliary calculus    Calculus of gallbladder with acute cholecystitis without obstruction    Class 3 severe obesity due to excess calories with serious comorbidity in adult (Multi)    Fibrocystic breast    Hematuria    Abnormal uterine bleeding    Encounter for Medicare annual wellness exam    BMI 60.0-69.9, adult (Multi)    Brain concussion    Elevated BP without diagnosis of hypertension    Mixed hyperlipidemia    Renal artery aneurysm (CMS-HCC)    Chronic pain of both knees    Adhesive capsulitis of left shoulder    Ventral hernia without obstruction or gangrene       Comprehensive Medical/Surgical/Social/Family History  Past Medical History:   Diagnosis Date    Dizziness 06/21/2024    Encounter to establish care 06/21/2024    Fall 06/21/2024     History reviewed. No pertinent surgical history.  Social History     Tobacco Use    Smoking status: Never    Smokeless tobacco: Never   Vaping Use    Vaping status: Never Used   Substance Use Topics    Alcohol use: Never    Drug use: Never     Family History   Problem Relation Name Age of Onset    COPD Mother       "Stomach cancer Mother      Other (ESRD) Mother      Breast cancer Mother  46    Hypertension Mother      Thyroid disease Father      Heart failure Father      Other (Bladder Cancer) Sister  52    Diabetes type II Daughter           Allergies and Medications  Iodine, Penicillins, and Sulfa (sulfonamide antibiotics)  Current Outpatient Medications on File Prior to Visit   Medication Sig Dispense Refill    albuterol 90 mcg/actuation inhaler Inhale 2 puffs every 4 hours if needed.      fluticasone (Flonase) 50 mcg/actuation nasal spray SPRAY 1 SPRAY INTO EACH NOSTRIL AT BEDTIME      miSOPROStoL (Cytotec) 200 mcg tablet Insert 1 tablet (200 mcg) into the vagina 1 time for 1 dose. Place 1 tablet high into vagina 4 hours before procedure 1 tablet 0    [DISCONTINUED] methylPREDNISolone (Medrol Dospak) 4 mg tablets TAKE 6 TABLETS ON DAY 1 AS DIRECTED ON PACKAGE AND DECREASE BY 1 TAB EACH DAY FOR A TOTAL OF 6 DAYS       No current facility-administered medications on file prior to visit.       Medications and Supplements  prescribed by me and other practitioners or clinical pharmacist (such as prescriptions, OTC's, herbal therapies and supplements) were reviewed and documented in the medical record.    Tobacco/Alcohol/Opioid use, as well as Illicit Drug Use was screened for/reviewed and documented in Social History section and medication list as appropriate       Advance directives  Advanced Care Planning (including a Living Will, Healthcare POA, as well as specific end of life choices and/or directives), was discussed  with the patient and/or surrogate, voluntarily, and documented in the medical record.     ROS otherwise negative aside from what was mentioned above in HPI.    Vitals  /62   Pulse 72   Resp 19   Ht 1.619 m (5' 3.75\")   Wt (!) 153 kg (337 lb)   SpO2 96%   BMI 58.30 kg/m²   Body mass index is 58.3 kg/m².  Physical Exam  Gen: Alert, NAD  HEENT:  PERRLA, EOMI, conjunctiva and sclera normal in " "appearance. External auditory canals/TMs normal; Oral cavity and posterior pharynx without lesions/exudate  Neck:  Supple with FROM; No masses/nodes palpable; Thyroid nontender and without nodules; No SAUL  Respiratory:  Lungs CTAB  Cardiovascular:  Heart RRR. No M/R/G. Peripheral pulses equal bilaterally  Abdomen:  Soft, nontender, BS present throughout; No R/G/R; No HSM or masses palpated  Extremities:  FROM all extremities; Muscle strength grossly normal with good tone  Neuro:  CN II-XII intact; Reflexes 2+/2+; Gross motor and sensory intact  Skin:  No suspicious lesions present  Breast: No masses, skin lesions or nipple discharges, no axillary lymphadenopathy    Assessment and Plan:  Problem List Items Addressed This Visit    MWE   Class 3 severe obesity due to excess calories with serious comorbidity in adult (Multi)    Above goal  Discussed lifestyle changes; increase regular physical activity,  Overview     Last Assessment & Plan:    Formatting of this note might be different from the original.   BMI 58.28         Hematuria    Asymptomatic  Sister with history of bladder cancer  Relevant Orders    Urinalysis with Reflex Microscopic    Encounter for Medicare annual wellness exam - Primary    BMI 60.0-69.9, adult (Multi)  Above goal  Discussed lifestyle changes; increase regular physical activity,    Mixed hyperlipidemia  Above goal  Discussed lifestyle changes; increase regular physical activity, increase fiber intake, cut back on red meats-cheese-sweets  Lab Results   Component Value Date    CHOL 207 (H) 06/21/2024     Lab Results   Component Value Date    HDL 43.3 06/21/2024     No results found for: \"LDLCALC\"  No results found for: \"TRIG\"  No components found for: \"CHOLHDL\"      Renal artery aneurysm (CMS-HCC)    No s/s  Hx of renal artery aneurysm  Lasted viewed on CT A/P 7/1/22: Incidentally noted small rim calcified splenic artery aneurysm, left renal artery aneurysm and right renal artery aneurysm. " Bilateral peripelvic renal cysts.   Relevant Orders    Recheck CT abdomen pelvis wo IV contrast    Chronic pain of both knees    Persisting  Work on weight loss  As needed NSAIDs sparingly  Relevant Orders    Referral to Physical Therapy    Adhesive capsulitis of left shoulder    Persisting; significant decreased range of motion  As needed NSAIDs sparingly  Relevant Orders    Referral to Physical Therapy    Referral to Orthopaedic Surgery    XR shoulder left 2+ views    Ventral hernia without obstruction or gangrene    Painless; history of repair  Relevant Orders    CT abdomen pelvis wo IV contrast           During the course of the visit the patient was educated and counseled about age appropriate screening and preventive services. Completed preventive screenings were documented in the chart and orders were placed for outstanding screenings/procedures as documented in the Assessment and Plan.      Patient Instructions (the written plan) was given to the patient at check out.    Follow-up in 4 months or sooner if needed    VANESSA Khoury-CNP

## 2024-07-29 ENCOUNTER — PATIENT MESSAGE (OUTPATIENT)
Dept: PRIMARY CARE | Facility: CLINIC | Age: 69
End: 2024-07-29
Payer: MEDICARE

## 2024-08-02 ENCOUNTER — HOSPITAL ENCOUNTER (OUTPATIENT)
Dept: RADIOLOGY | Facility: CLINIC | Age: 69
Discharge: HOME | End: 2024-08-02
Payer: MEDICARE

## 2024-08-02 DIAGNOSIS — K43.9 VENTRAL HERNIA WITHOUT OBSTRUCTION OR GANGRENE: ICD-10-CM

## 2024-08-02 DIAGNOSIS — I72.2 RENAL ARTERY ANEURYSM (CMS-HCC): ICD-10-CM

## 2024-08-02 LAB
CYTOLOGY CMNT CVX/VAG CYTO-IMP: NORMAL
HPV HR 12 DNA GENITAL QL NAA+PROBE: NEGATIVE
HPV HR GENOTYPES PNL CVX NAA+PROBE: NEGATIVE
HPV16 DNA SPEC QL NAA+PROBE: NEGATIVE
HPV18 DNA SPEC QL NAA+PROBE: NEGATIVE
LAB AP HPV GENOTYPE QUESTION: YES
LAB AP HPV HR: NORMAL
LABORATORY COMMENT REPORT: NORMAL
LABORATORY COMMENT REPORT: NORMAL
PATH REPORT.TOTAL CANCER: NORMAL

## 2024-08-02 PROCEDURE — 74176 CT ABD & PELVIS W/O CONTRAST: CPT

## 2024-08-05 DIAGNOSIS — I72.2 RENAL ARTERY ANEURYSM (CMS-HCC): Primary | ICD-10-CM

## 2024-08-05 DIAGNOSIS — I72.8 SPLENIC ARTERY ANEURYSM (CMS-HCC): ICD-10-CM

## 2024-09-06 ENCOUNTER — PROCEDURE VISIT (OUTPATIENT)
Dept: OBSTETRICS AND GYNECOLOGY | Facility: CLINIC | Age: 69
End: 2024-09-06
Payer: MEDICARE

## 2024-09-06 VITALS — SYSTOLIC BLOOD PRESSURE: 114 MMHG | DIASTOLIC BLOOD PRESSURE: 82 MMHG | BODY MASS INDEX: 60.07 KG/M2 | WEIGHT: 293 LBS

## 2024-09-06 DIAGNOSIS — N95.0 PMB (POSTMENOPAUSAL BLEEDING): Primary | ICD-10-CM

## 2024-09-06 PROCEDURE — 58558 HYSTEROSCOPY BIOPSY: CPT | Performed by: OBSTETRICS & GYNECOLOGY

## 2024-09-06 PROCEDURE — 88305 TISSUE EXAM BY PATHOLOGIST: CPT | Mod: TC | Performed by: OBSTETRICS & GYNECOLOGY

## 2024-09-06 PROCEDURE — 81288 MLH1 GENE: CPT | Performed by: OBSTETRICS & GYNECOLOGY

## 2024-09-06 PROCEDURE — G0452 MOLECULAR PATHOLOGY INTERPR: HCPCS | Performed by: OBSTETRICS & GYNECOLOGY

## 2024-09-06 ASSESSMENT — PATIENT HEALTH QUESTIONNAIRE - PHQ9
1. LITTLE INTEREST OR PLEASURE IN DOING THINGS: NOT AT ALL
SUM OF ALL RESPONSES TO PHQ9 QUESTIONS 1 AND 2: 0
2. FEELING DOWN, DEPRESSED OR HOPELESS: NOT AT ALL

## 2024-09-06 ASSESSMENT — ENCOUNTER SYMPTOMS
OCCASIONAL FEELINGS OF UNSTEADINESS: 0
LOSS OF SENSATION IN FEET: 0
DEPRESSION: 0

## 2024-09-06 ASSESSMENT — PAIN SCALES - GENERAL: PAINLEVEL: 0-NO PAIN

## 2024-09-06 NOTE — PROGRESS NOTES
Patient ID: Flaquita Watson is a 69 y.o. female.    Procedures             Hysteroscopy And Biopsy         Consent: Yes.          Prep: betadine.          Endocervix Canal normal.          Uterine Cavity normal uterine cavity.polyps seen         Tubal Ostia seen bilaterally.          Endometrial Biopsy performed.          Sounding  9 cm.          Tolerance of Procedure: well.     Endometrial biopsy         Performed by: Noemi May MD  Authorized by: Noemi May MD    Consent:     Consent obtained: verbal    Consent given by: patient    Risks discussed: bleeding, damage to other organs, conversion to surgery, infection, need for repeat procedure, pain and loss of function    Alternatives discussed: alternative treatment    Patient agrees, verbalizes understanding, and wants to proceed: yes    Indications:     Indications: abnormal uterine bleeding          Chronicity of post-coital bleeding: recurrent       Pre-procedure:     Urine pregnancy test: negative      Premeds: acetaminophen    Procedure:     A bimanual exam was performed: yes      Uterus size: 6-8 weeks    Uterus position: anteverted    Prepped with: Betadine    Tenaculum used: yes      A local block was performed: no      Local anesthetic: yes     Cervix dilated: no      Number of passes: 3  Findings:     Cervix: normal      Uterus depth by sound (cm): 9    Specimen collected: specimen collected and sent to pathology      Patient tolerance: tolerated well, no immediate complications

## 2024-09-11 LAB
LAB AP ASR DISCLAIMER: NORMAL
LABORATORY COMMENT REPORT: NORMAL
PATH REPORT.FINAL DX SPEC: NORMAL
PATH REPORT.GROSS SPEC: NORMAL
PATH REPORT.RELEVANT HX SPEC: NORMAL
PATH REPORT.TOTAL CANCER: NORMAL

## 2024-09-16 LAB
LAB AP ASR DISCLAIMER: NORMAL
LABORATORY COMMENT REPORT: NORMAL
PATH REPORT.ADDENDUM SPEC: NORMAL
PATH REPORT.ADDENDUM SPEC: NORMAL
PATH REPORT.FINAL DX SPEC: NORMAL
PATH REPORT.GROSS SPEC: NORMAL
PATH REPORT.RELEVANT HX SPEC: NORMAL
PATH REPORT.TOTAL CANCER: NORMAL

## 2024-09-18 ENCOUNTER — OFFICE VISIT (OUTPATIENT)
Dept: OBSTETRICS AND GYNECOLOGY | Facility: CLINIC | Age: 69
End: 2024-09-18
Payer: MEDICARE

## 2024-09-18 VITALS
BODY MASS INDEX: 50.02 KG/M2 | DIASTOLIC BLOOD PRESSURE: 84 MMHG | SYSTOLIC BLOOD PRESSURE: 140 MMHG | WEIGHT: 293 LBS | HEIGHT: 64 IN

## 2024-09-18 DIAGNOSIS — C54.1 ENDOMETRIAL CANCER DETERMINED BY UTERINE BIOPSY (MULTI): Primary | ICD-10-CM

## 2024-09-18 PROCEDURE — 99214 OFFICE O/P EST MOD 30 MIN: CPT | Performed by: OBSTETRICS & GYNECOLOGY

## 2024-09-18 PROCEDURE — 1159F MED LIST DOCD IN RCRD: CPT | Performed by: OBSTETRICS & GYNECOLOGY

## 2024-09-18 PROCEDURE — 1036F TOBACCO NON-USER: CPT | Performed by: OBSTETRICS & GYNECOLOGY

## 2024-09-18 PROCEDURE — 1126F AMNT PAIN NOTED NONE PRSNT: CPT | Performed by: OBSTETRICS & GYNECOLOGY

## 2024-09-18 PROCEDURE — 1160F RVW MEDS BY RX/DR IN RCRD: CPT | Performed by: OBSTETRICS & GYNECOLOGY

## 2024-09-18 PROCEDURE — 3008F BODY MASS INDEX DOCD: CPT | Performed by: OBSTETRICS & GYNECOLOGY

## 2024-09-18 ASSESSMENT — ENCOUNTER SYMPTOMS
LOSS OF SENSATION IN FEET: 0
OCCASIONAL FEELINGS OF UNSTEADINESS: 0
DEPRESSION: 0

## 2024-09-18 ASSESSMENT — PATIENT HEALTH QUESTIONNAIRE - PHQ9
1. LITTLE INTEREST OR PLEASURE IN DOING THINGS: NOT AT ALL
2. FEELING DOWN, DEPRESSED OR HOPELESS: NOT AT ALL
SUM OF ALL RESPONSES TO PHQ9 QUESTIONS 1 AND 2: 0

## 2024-09-18 ASSESSMENT — PAIN SCALES - GENERAL: PAINLEVEL: 0-NO PAIN

## 2024-09-18 NOTE — PROGRESS NOTES
Flaquita Watson is a 69 y.o. female,  who presented with Postmenopausal bleeding       She had a biopsy showing Endometrial adenocarcinoma, endometrioid type, FIGO grade 1        Obstetrical History:  OB History          2    Para   2    Term   2            AB        Living             SAB        IAB        Ectopic        Multiple        Live Births                         Past Medical History:   Diagnosis Date    Dizziness 2024    Encounter to establish care 2024    Fall 2024     History reviewed. No pertinent surgical history.  Family History   Problem Relation Name Age of Onset    COPD Mother      Stomach cancer Mother      Other (ESRD) Mother      Breast cancer Mother  46    Hypertension Mother      Thyroid disease Father      Heart failure Father      Other (Bladder Cancer) Sister  52    Diabetes type II Daughter       Social History     Tobacco Use    Smoking status: Never    Smokeless tobacco: Never   Vaping Use    Vaping status: Never Used   Substance Use Topics    Alcohol use: Not Currently    Drug use: Never     Social History     Tobacco Use   Smoking Status Never   Smokeless Tobacco Never       Current Outpatient Medications on File Prior to Visit   Medication Sig Dispense Refill    albuterol 90 mcg/actuation inhaler Inhale 2 puffs every 4 hours if needed.      fluticasone (Flonase) 50 mcg/actuation nasal spray SPRAY 1 SPRAY INTO EACH NOSTRIL AT BEDTIME       No current facility-administered medications on file prior to visit.     Allergies   Allergen Reactions    Iodine Itching    Penicillins Other    Sulfa (Sulfonamide Antibiotics) Other         REVIEW OF SYSTEMS:    General: No weight loss, malaise or fevers or other constitutional symptoms.  Neuro: No history of TIA's, stroke,.  No neurological symptoms or problems. No visual changes  Respiratory: No history of respiratory/pulmonary symptoms or problems.  Cardiovascular: No history of cardiovascular symptoms or  no palpitations/no dyspnea on exertion/no peripheral edema/no chest pain/no paroxysmal nocturnal dyspnea "problems.  GI: No history of GI symptoms or problems.   : No history of UTI in past 6 weeks.  No history of  symptoms or problems.  BREASTS: No skin dimpling, nipple discharge or masses.  Endocrine: No history of diabetes. No Thyroid symptoms  Hematology: No history of bleeding or clotting disorder.  Skin: Negative for lesions, rash, and itching.      PHYSICAL EXAMINATION:    /84   Ht 1.619 m (5' 3.75\")   Wt (!) 157 kg (346 lb 6.4 oz)   BMI 59.93 kg/m²        ASSESSMENT and Plan:      Flaquita Watson is a 69 y.o. female,  who  presented with PMB  Today was breaking bad news   Patient was appropriately emotional   Discussed pathology   Referral done        Noemi May MD    "

## 2024-09-25 LAB
ELECTRONICALLY SIGNED BY: NORMAL
MLH1 METHYLATION RESULT: NORMAL

## 2024-09-27 PROBLEM — C54.1 ENDOMETRIAL CANCER (MULTI): Status: ACTIVE | Noted: 2024-09-27

## 2024-09-28 NOTE — PROGRESS NOTES
"  Gynecologic Oncology Initial Consultation  [facility]    Patient ID: Flaquita Watson, 69 y.o.  Referring Physician: Noemi May MD  [address] [place] [address]  Primary Care Provider: MITRA Khoury      Reason for Consultation: endometrial cancer  Subjective    HPI 68 y/o F here in consultation for endometrial cancer. PMB for the last 3-4 months. Seen by GYN. Pelvic US with thickened EML 2 cm. She completed an office EMBx with result endometrial adenocarcinoma, endometrioid type, FIGO grade 1 p53wt MMRd MLH1 hypermethylation. Referred for further management.     Denies fevers/chills, N/V, CP, SOB, abdominal pain, dysuria, hematuria, constipation, diarrhea.    A comprehensive review of systems was performed and otherwise negative.    Objective      Past Medical History:   Diagnosis Date    Dizziness 2024    Endometrial cancer (Multi)     Fall 2024    Obesity         History reviewed. No pertinent surgical history.     Family History   Problem Relation Name Age of Onset    COPD Mother      Stomach cancer Mother      Other (ESRD) Mother      Breast cancer Mother  46    Hypertension Mother      Thyroid disease Father      Heart failure Father      Other (Bladder Cancer) Sister  52    Diabetes type II Daughter       Otherwise, denies history of endometrial, ovarian, breast, prostate, or colorectal cancers.    OBGYN Hx:  -   - Menarche at 14; Menopause at 54; denies HRT use  - Last Pap 24 NILM - HPV    Screening:  - Last Mammogram: 24 BIRADS-1  - Last Colonoscopy: 24 cologuard negative    Social Hx:  Flaquita Watson  reports that she has never smoked. She has never used smokeless tobacco.  She  reports that she does not currently use alcohol.  She  reports no history of drug use.  Lives at home alone. Manages a .     Physical Exam  BSA: 2.62 meters squared  /75   Pulse 78   Temp 35.8 °C (96.4 °F) (Temporal)   Resp 18   Ht 1.6 m (5' 2.99\")   Wt (!) 154 " kg (339 lb 15.2 oz)   SpO2 97%   BMI 60.23 kg/m²   General:   alert and oriented, in no acute distress   Heart: regular rate and rhythm, S1, S2 normal, no murmur, click, rub or gallop   Lungs: clear to auscultation bilaterally   Abdomen: soft, non-tender, without masses or organomegaly and obese   Vulva: normal   Vagina: normal mucosa   Cervix: no lesions   Uterus: Limited by habitus   Adnexa: no mass, fullness, tenderness   Rectal: deferred   Lymph Nodes:  Cervical, supraclavicular, and axillary nodes normal.   Extremities: warm, well-perfused without cyanosis, clubbing or edema   Skin: Normal   Lines/Access:      Pathology:  FINAL DIAGNOSIS      ENDOMETRIUM, BIOPSY:              Endometrial adenocarcinoma, endometrioid type, FIGO grade 1, see comment.       Imaging:  Pelvic US 6/26/24  IMPRESSION:  1. Abnormally thickened endometrium measuring up to 2 cm. Further  gynecologic workup is advised.  2. Bilateral ovaries are not visualized. No adnexal masses are seen.      CTAP 8/2/24  IMPRESSION:  1.  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 Consider CTA  of the abdomen and pelvis on follow-up exams.  2. Moderate-sized ventral abdominal wall hernia containing fat and a  nondilated loop of transverse colon.  3. Cholelithiasis without cholecystitis.  4. Small sliding type 1 hiatal hernia.    Performance Status:  Asymptomatic    Assessment/Plan    69 y.o. with Endometrial adenocarcinoma, endometrioid type, FIGO grade 1 p53wt MMRd MLH1 hypermethylation  Comorbidities: obesity (BMI 60), splenic and renal artery aneurysm    Oncology History Overview Note   9/6/24 EMBx Endometrial adenocarcinoma, endometrioid type, FIGO grade 1 p53wt MMRd MLH1 hypermethylation     Endometrial cancer (Multi)   9/27/2024 Initial Diagnosis    Endometrial cancer (Multi)            Diagnoses and all  orders for this visit:  Endometrial cancer (Multi)  - Discussed the significance of histologic subtype, grade and stage  - Discussed management options including medical, non-surgical, and surgical options.   - Discussed recommendation for surgery with a hysterectomy, BSO, sentinel lymph node mapping and biopsy with possible need for unilateral or bilateral lymphadenectomy. We discussed my recommendation for a minimally invasive approach  - Discussed surgical risks, anticipated hospital stay, and recovery   - Plan for RaT BSO SLNDx  - Will require clearance from vascular medicine for surgery   - Consider LNG-IUD with PO progesterone if surgery cannot be performed  - She will need PAT    Abdominal viscera aneurysm  - Encourage follow up with vascular medicine    Ksenia Marrufo MD MPH

## 2024-10-01 ENCOUNTER — APPOINTMENT (OUTPATIENT)
Dept: OBSTETRICS AND GYNECOLOGY | Facility: CLINIC | Age: 69
End: 2024-10-01
Payer: MEDICARE

## 2024-10-03 ENCOUNTER — OFFICE VISIT (OUTPATIENT)
Dept: GYNECOLOGIC ONCOLOGY | Facility: HOSPITAL | Age: 69
End: 2024-10-03
Payer: MEDICARE

## 2024-10-03 ENCOUNTER — PATIENT MESSAGE (OUTPATIENT)
Dept: PRIMARY CARE | Facility: CLINIC | Age: 69
End: 2024-10-03
Payer: MEDICARE

## 2024-10-03 VITALS
BODY MASS INDEX: 51.91 KG/M2 | WEIGHT: 293 LBS | HEIGHT: 63 IN | HEART RATE: 78 BPM | DIASTOLIC BLOOD PRESSURE: 75 MMHG | OXYGEN SATURATION: 97 % | RESPIRATION RATE: 18 BRPM | TEMPERATURE: 96.4 F | SYSTOLIC BLOOD PRESSURE: 155 MMHG

## 2024-10-03 DIAGNOSIS — C54.1 ENDOMETRIAL CANCER DETERMINED BY UTERINE BIOPSY (MULTI): ICD-10-CM

## 2024-10-03 DIAGNOSIS — C54.1 ENDOMETRIAL CANCER (MULTI): Primary | ICD-10-CM

## 2024-10-03 PROCEDURE — 99215 OFFICE O/P EST HI 40 MIN: CPT | Performed by: STUDENT IN AN ORGANIZED HEALTH CARE EDUCATION/TRAINING PROGRAM

## 2024-10-03 ASSESSMENT — PAIN SCALES - GENERAL: PAINLEVEL: 0-NO PAIN

## 2024-10-03 ASSESSMENT — PATIENT HEALTH QUESTIONNAIRE - PHQ9
2. FEELING DOWN, DEPRESSED OR HOPELESS: SEVERAL DAYS
10. IF YOU CHECKED OFF ANY PROBLEMS, HOW DIFFICULT HAVE THESE PROBLEMS MADE IT FOR YOU TO DO YOUR WORK, TAKE CARE OF THINGS AT HOME, OR GET ALONG WITH OTHER PEOPLE: NOT DIFFICULT AT ALL
1. LITTLE INTEREST OR PLEASURE IN DOING THINGS: NOT AT ALL
SUM OF ALL RESPONSES TO PHQ9 QUESTIONS 1 AND 2: 1

## 2024-10-07 DIAGNOSIS — I72.2 RENAL ARTERY ANEURYSM (CMS-HCC): Primary | ICD-10-CM

## 2024-10-08 ENCOUNTER — LAB (OUTPATIENT)
Dept: LAB | Facility: LAB | Age: 69
End: 2024-10-08
Payer: MEDICARE

## 2024-10-08 DIAGNOSIS — I72.2 RENAL ARTERY ANEURYSM (CMS-HCC): ICD-10-CM

## 2024-10-08 LAB
ALBUMIN SERPL BCP-MCNC: 3.9 G/DL (ref 3.4–5)
ANION GAP SERPL CALC-SCNC: 13 MMOL/L (ref 10–20)
BUN SERPL-MCNC: 13 MG/DL (ref 6–23)
CALCIUM SERPL-MCNC: 9 MG/DL (ref 8.6–10.3)
CHLORIDE SERPL-SCNC: 106 MMOL/L (ref 98–107)
CO2 SERPL-SCNC: 25 MMOL/L (ref 21–32)
CREAT SERPL-MCNC: 0.81 MG/DL (ref 0.5–1.05)
EGFRCR SERPLBLD CKD-EPI 2021: 80 ML/MIN/1.73M*2
GLUCOSE SERPL-MCNC: 92 MG/DL (ref 74–99)
PHOSPHATE SERPL-MCNC: 3.2 MG/DL (ref 2.5–4.9)
POTASSIUM SERPL-SCNC: 4.2 MMOL/L (ref 3.5–5.3)
SODIUM SERPL-SCNC: 140 MMOL/L (ref 136–145)

## 2024-10-08 PROCEDURE — 80069 RENAL FUNCTION PANEL: CPT

## 2024-10-08 PROCEDURE — 36415 COLL VENOUS BLD VENIPUNCTURE: CPT

## 2024-10-09 ENCOUNTER — APPOINTMENT (OUTPATIENT)
Dept: RADIOLOGY | Facility: HOSPITAL | Age: 69
End: 2024-10-09
Payer: MEDICARE

## 2024-10-10 ENCOUNTER — HOSPITAL ENCOUNTER (OUTPATIENT)
Dept: RADIOLOGY | Facility: HOSPITAL | Age: 69
Discharge: HOME | End: 2024-10-10
Payer: MEDICARE

## 2024-10-10 DIAGNOSIS — Z88.8 ALLERGY TO IODINE: Primary | ICD-10-CM

## 2024-10-10 DIAGNOSIS — I72.2 RENAL ARTERY ANEURYSM (CMS-HCC): ICD-10-CM

## 2024-10-10 RX ORDER — FAMOTIDINE 20 MG/1
TABLET, FILM COATED ORAL
Qty: 1 TABLET | Refills: 0 | Status: SHIPPED | OUTPATIENT
Start: 2024-10-10

## 2024-10-10 RX ORDER — PREDNISONE 50 MG/1
TABLET ORAL
Qty: 3 TABLET | Refills: 0 | Status: SHIPPED | OUTPATIENT
Start: 2024-10-10

## 2024-10-11 DIAGNOSIS — C54.1 ENDOMETRIAL CANCER (MULTI): Primary | ICD-10-CM

## 2024-10-11 NOTE — PROGRESS NOTES
Discussed need for pelvic MRI for staging endometrial cancer. Will plan for hormonal therapy while she undergoes further work up and management of renal and splenic aneurysms.     Ksenia Marrufo MD MPH

## 2024-10-14 ENCOUNTER — HOSPITAL ENCOUNTER (OUTPATIENT)
Dept: RADIOLOGY | Facility: HOSPITAL | Age: 69
Discharge: HOME | End: 2024-10-14
Payer: MEDICARE

## 2024-10-14 DIAGNOSIS — C54.1 ENDOMETRIAL CANCER (MULTI): ICD-10-CM

## 2024-10-14 DIAGNOSIS — C54.1 ENDOMETRIAL CANCER (MULTI): Primary | ICD-10-CM

## 2024-10-14 PROCEDURE — 71275 CT ANGIOGRAPHY CHEST: CPT

## 2024-10-14 PROCEDURE — 71275 CT ANGIOGRAPHY CHEST: CPT | Performed by: STUDENT IN AN ORGANIZED HEALTH CARE EDUCATION/TRAINING PROGRAM

## 2024-10-14 PROCEDURE — A9575 INJ GADOTERATE MEGLUMI 0.1ML: HCPCS | Performed by: STUDENT IN AN ORGANIZED HEALTH CARE EDUCATION/TRAINING PROGRAM

## 2024-10-14 PROCEDURE — 72197 MRI PELVIS W/O & W/DYE: CPT | Performed by: RADIOLOGY

## 2024-10-14 PROCEDURE — 2550000001 HC RX 255 CONTRASTS: Performed by: STUDENT IN AN ORGANIZED HEALTH CARE EDUCATION/TRAINING PROGRAM

## 2024-10-14 PROCEDURE — 74174 CTA ABD&PLVS W/CONTRAST: CPT | Performed by: STUDENT IN AN ORGANIZED HEALTH CARE EDUCATION/TRAINING PROGRAM

## 2024-10-14 PROCEDURE — 72197 MRI PELVIS W/O & W/DYE: CPT

## 2024-10-14 RX ORDER — GADOTERATE MEGLUMINE 376.9 MG/ML
20 INJECTION INTRAVENOUS
Status: COMPLETED | OUTPATIENT
Start: 2024-10-14 | End: 2024-10-14

## 2024-10-14 RX ORDER — GADOTERATE MEGLUMINE 376.9 MG/ML
10 INJECTION INTRAVENOUS
Status: COMPLETED | OUTPATIENT
Start: 2024-10-14 | End: 2024-10-14

## 2024-10-15 ENCOUNTER — APPOINTMENT (OUTPATIENT)
Dept: RADIOLOGY | Facility: HOSPITAL | Age: 69
End: 2024-10-15
Payer: MEDICARE

## 2024-10-15 ENCOUNTER — OFFICE VISIT (OUTPATIENT)
Dept: VASCULAR SURGERY | Facility: HOSPITAL | Age: 69
End: 2024-10-15
Payer: MEDICARE

## 2024-10-15 ENCOUNTER — TELEPHONE (OUTPATIENT)
Dept: VASCULAR SURGERY | Facility: HOSPITAL | Age: 69
End: 2024-10-15
Payer: MEDICARE

## 2024-10-15 VITALS
HEART RATE: 67 BPM | WEIGHT: 293 LBS | SYSTOLIC BLOOD PRESSURE: 164 MMHG | OXYGEN SATURATION: 96 % | HEIGHT: 63 IN | BODY MASS INDEX: 51.91 KG/M2 | DIASTOLIC BLOOD PRESSURE: 79 MMHG

## 2024-10-15 DIAGNOSIS — I72.2 RENAL ARTERY ANEURYSM (CMS-HCC): Primary | ICD-10-CM

## 2024-10-15 PROCEDURE — 99215 OFFICE O/P EST HI 40 MIN: CPT | Performed by: STUDENT IN AN ORGANIZED HEALTH CARE EDUCATION/TRAINING PROGRAM

## 2024-10-15 ASSESSMENT — PAIN SCALES - GENERAL: PAINLEVEL: 0-NO PAIN

## 2024-10-15 NOTE — PATIENT INSTRUCTIONS
Return in 6 months for a follow up visit with a CT angiogram abdomen and pelvis done before.     2. To schedule your CT angiogram, Call [phone], ask for Radiology Dept.       3.  Get your blood drawn 2 weeks before your CT angiogram to ensure that your kidneys are functioning well.  You can get your lab drawn at any  facility near you.

## 2024-10-16 NOTE — PROGRESS NOTES
Evaluation of multiple visceral aneurysms.  Primary Care Physician: MITRA Khoury  Referring Provider: No referring provider defined for this encounter.  Date of Visit: 10/15/2024  4:00 PM EDT  Location of visit: [facility]     Chief Complaint:   Chief Complaint   Patient presents with    New Patient Visit        HPI / Summary:   Flaquita Watson is a 69 y.o. female presents for multiple visceral aneurysms.  She states that she is known about these from previous CT scans that she has had.  She was that she was told in the past that they do not require intervention.      Upon chart review, she has a CT scan from [facility] from 2022 is notes a 2.6 cm right renal artery aneurysm and a 9 mm left renal artery aneurysm. It also notes a 1.5 cm splenic artery aneurysm.     Her most recent CTA performed 10/2024 was independently reviewed by me.  She does still have a 1.8 cm splenic artery aneurysm.  She has a 2.9 cm right renal artery aneurysm and a 1.4 cm left renal artery aneurysm.     Patient is asymptomatic at this time.  She has no family history of visceral artery aneurysms.  She has recently been diagnosed with endometrial cancer and her gynecology oncology team is deciding whether she should undergo surgical or medical therapy for this.  She otherwise has normal renal function.    Medical History:   Endometrial cancer, morbid obesity  Surgical Hx:   Open cholecystectomy for perforated cholecystitis, she also required drainage  Social Hx:   Non-smoker, works, lives independently  Family Hx:   Has multiple family numbers with cancer  Allergies:   Iodine, Penicillins, and Sulfa (sulfonamide antibiotics)    Outpatient Medications:  Current Outpatient Medications   Medication Sig Dispense Refill    albuterol 90 mcg/actuation inhaler Inhale 2 puffs every 4 hours if needed.      diphenhydrAMINE (BENADryl) 50 mg tablet Take one tablet 1 hour before procedure. 1 tablet 0    famotidine (Pepcid) 20 mg tablet Take one  "tablet 1 hour before procedure. 1 tablet 0    fluticasone (Flonase) 50 mcg/actuation nasal spray SPRAY 1 SPRAY INTO EACH NOSTRIL AT BEDTIME      predniSONE (Deltasone) 50 mg tablet Take one tablet 13 hours before procedure, take one tablet 7 hours before procedure, take the last tablet 1 hour before procedure. 3 tablet 0     No current facility-administered medications for this visit.     Review of Systems:  Review of Systems     Physical Exam:  Blood pressure 164/79, pulse 67, height 1.6 m (5' 3\"), weight (!) 155 kg (342 lb), SpO2 96%.  Wt Readings from Last 1 Encounters:   10/15/24 (!) 155 kg (342 lb)     Physical Exam  Neuro: alert and oriented x3  Resp: comfortable on room air  CV: well perfused  Abd: soft ntnd, very obese  Pulse exam: Palpable pedal pulses    Last Labs:  CMP:    Chemistry    Lab Results   Component Value Date/Time     10/08/2024 1609    K 4.2 10/08/2024 1609     10/08/2024 1609    CO2 25 10/08/2024 1609    BUN 13 10/08/2024 1609    CREATININE 0.81 10/08/2024 1609    Lab Results   Component Value Date/Time    CALCIUM 9.0 10/08/2024 1609    ALKPHOS 110 06/21/2024 1245    AST 14 06/21/2024 1245    ALT 12 06/21/2024 1245    BILITOT 1.1 06/21/2024 1245          CBC:  Lab Results   Component Value Date    WBC 8.5 06/21/2024    HGB 13.0 06/21/2024    HCT 41.0 06/21/2024    MCV 98 06/21/2024     06/21/2024     HEME/ENDO:  Recent Labs     06/21/24  1245   TSH 2.00   HGBA1C 5.8*      CARDIAC: No data recorded  No results found for: \"LDLCALC\"    Notable Studies:         Assessment/Plan   69-year-old female with new diagnosis of endometrial cancer who presents for evaluation of visceral artery aneurysms.    #Right renal artery aneurysm, 3 cm  - Patient does have evidence of growth since her 2022 CT scan.  She measures about 2.9- 3 cm which indicates she likely needs to have surgical intervention.  Unfortunately, her aneurysm is distal and involves multiple branches into her kidney.  She " would not be a good endovascular candidate.  Additionally, given her morbid obesity, I do not believe that she is a good open surgery candidate.  She also has a large Kocher incision and had perforated cholecystitis which would make dissection and open surgical pair extremely difficult likely from prior inflammatory response.  Given that she is borderline and asymptomatic, I believe that observation and surveillance is appropriate at this time.  I have also discussed weight loss of this patient.  I discussed that she will likely require an open surgical management at some point however he would be unsafe for us to operate with her current BMI of 60.  I discussed potential referral to bariatrics or nutrition however patient is not interested at this time.  She wants to lose weight on her own.  I will follow-up with her on her weight loss in several months.    #Left renal artery aneurysm, 1.4 cm  - This aneurysm has grown since her previous CT scan in 2022.  However it does not meet criteria for repair.  Will continue to monitor with surveillance.    #Splenic artery aneurysm, 1.5 cm  - This aneurysm does not meet criteria for repair.  We will continue to monitor with surveillance.      Will repeat CT scan in 6 months.  Patient knows to call if she has any questions or concerns.  She knows to call if she has any symptoms.  Orders:  Orders Placed This Encounter   Procedures    CT angio abdomen pelvis w and or wo IV IV contrast    Basic metabolic panel              ____________________________________________________________  Kaitlyn M Dunphy, MD  [facility]  [facility]

## 2024-10-17 ENCOUNTER — APPOINTMENT (OUTPATIENT)
Dept: GYNECOLOGIC ONCOLOGY | Facility: HOSPITAL | Age: 69
End: 2024-10-17
Payer: MEDICARE

## 2024-10-17 NOTE — TUMOR BOARD NOTE
Gynecologic Oncology Tumor Board 10/18/2024         Flaquita Watson  69 y.o.    1955  MRN [id number]    Provider: Ksenia Marrufo MD  Disease Site/Stage: Uterine  Pathology: FIGO G1 endometrioid endometrial adenocarcinoma  Prior Therapy:  None  Impression: clinical stage IA G1 endometrial carcinoma and renal artery aneurysm    We reviewed previous medical and familial history, history of present illness, and recent lab results along with all available histopathologic and imaging studies. The tumor board considered available treatment options and made the following recommendations.    Recommendations:     Offer conservative management with hormonal therapy     Clinical Trial Consideration: None Available    National site-specific guidelines were discussed with respect to the case. It is recognized that there may be additional factors not discussed in the Review Board discussion that can influence management decisions, and alternative management options which fall within the standard of care. Accordingly the final treatment disposition will be determined by the patient, in the context of an informed discussion with their providers. The actual prescribed management or treatment plan may or may not be consistent with the Review Board recommendations.

## 2024-10-18 ENCOUNTER — TUMOR BOARD CONFERENCE (OUTPATIENT)
Dept: HEMATOLOGY/ONCOLOGY | Facility: HOSPITAL | Age: 69
End: 2024-10-18

## 2024-10-23 ENCOUNTER — APPOINTMENT (OUTPATIENT)
Dept: GYNECOLOGIC ONCOLOGY | Facility: HOSPITAL | Age: 69
End: 2024-10-23
Payer: MEDICARE

## 2024-10-24 ENCOUNTER — OFFICE VISIT (OUTPATIENT)
Dept: GYNECOLOGIC ONCOLOGY | Facility: HOSPITAL | Age: 69
End: 2024-10-24
Payer: MEDICARE

## 2024-10-24 ENCOUNTER — APPOINTMENT (OUTPATIENT)
Dept: GYNECOLOGIC ONCOLOGY | Facility: HOSPITAL | Age: 69
End: 2024-10-24
Payer: MEDICARE

## 2024-10-24 VITALS
TEMPERATURE: 97.5 F | WEIGHT: 293 LBS | OXYGEN SATURATION: 97 % | RESPIRATION RATE: 21 BRPM | HEIGHT: 63 IN | HEART RATE: 71 BPM | DIASTOLIC BLOOD PRESSURE: 78 MMHG | BODY MASS INDEX: 51.91 KG/M2 | SYSTOLIC BLOOD PRESSURE: 139 MMHG

## 2024-10-24 DIAGNOSIS — E66.01 CLASS 3 SEVERE OBESITY WITH SERIOUS COMORBIDITY AND BODY MASS INDEX (BMI) OF 60.0 TO 69.9 IN ADULT, UNSPECIFIED OBESITY TYPE: ICD-10-CM

## 2024-10-24 DIAGNOSIS — C54.1 ENDOMETRIAL CANCER (MULTI): Primary | ICD-10-CM

## 2024-10-24 DIAGNOSIS — E66.813 CLASS 3 SEVERE OBESITY WITH SERIOUS COMORBIDITY AND BODY MASS INDEX (BMI) OF 60.0 TO 69.9 IN ADULT, UNSPECIFIED OBESITY TYPE: ICD-10-CM

## 2024-10-24 PROCEDURE — 99214 OFFICE O/P EST MOD 30 MIN: CPT | Performed by: STUDENT IN AN ORGANIZED HEALTH CARE EDUCATION/TRAINING PROGRAM

## 2024-10-24 RX ORDER — MEGESTROL ACETATE 40 MG/1
80 TABLET ORAL 2 TIMES DAILY
Qty: 240 TABLET | Refills: 5 | Status: SHIPPED | OUTPATIENT
Start: 2024-10-24 | End: 2025-10-24

## 2024-10-24 ASSESSMENT — PAIN SCALES - GENERAL: PAINLEVEL_OUTOF10: 0-NO PAIN

## 2024-10-24 NOTE — LETTER
"October 24, 2024     MITRA Khoury  [address] [place], [facility] [address]  [facility] [address]    Patient: Flaquita Watson   YOB: 1955   Date of Visit: 10/24/2024       Dear MITRA Kaplan:    Thank you for referring Flaquita Watson to me for evaluation. Below are my notes for this consultation.  If you have questions, please do not hesitate to call me. I look forward to following your patient along with you.       Sincerely,     Ksenia Marrufo MD MPH      CC: Kaitlyn M Dunphy, MD  ______________________________________________________________________________________    Patient ID: Flaquita Watson is a 69 y.o. female.  Referring Physician: No referring provider defined for this encounter.  Primary Care Provider: MITRA Khoury    Subjective   Here on follow up to discuss MRI. Continues to have vaginal bleeding. Was seen by vascular surgery. Surveillance recommended. Plan for repeat imaging in 6 months. Reports intermittent binge eating with weight gain after weight loss. Ongoing stress related to work and family health, as well as her health.     Objective   BSA: 2.62 meters squared  /78   Pulse 71   Temp 36.4 °C (97.5 °F) (Temporal)   Resp 21   Ht 1.6 m (5' 2.99\")   Wt (!) 154 kg (338 lb 13.6 oz)   SpO2 97%   BMI 60.04 kg/m²      Physical Exam  Vitals and nursing note reviewed. Exam conducted with a chaperone present.   Constitutional:       General: She is not in acute distress.     Appearance: Normal appearance.   HENT:      Head: Normocephalic and atraumatic.      Mouth/Throat:      Mouth: Mucous membranes are moist.      Pharynx: No oropharyngeal exudate or posterior oropharyngeal erythema.   Eyes:      Extraocular Movements: Extraocular movements intact.      Conjunctiva/sclera: Conjunctivae normal.      Pupils: Pupils are equal, round, and reactive to light.   Neck:      Thyroid: No thyroid mass or thyromegaly.   Cardiovascular:      Rate and " Rhythm: Normal rate and regular rhythm.      Pulses: Normal pulses.      Heart sounds: Normal heart sounds.   Pulmonary:      Effort: Pulmonary effort is normal.      Breath sounds: Normal breath sounds. No wheezing, rhonchi or rales.   Abdominal:      General: Bowel sounds are normal. There is no distension.      Palpations: Abdomen is soft. There is no mass.      Tenderness: There is no abdominal tenderness.      Hernia: No hernia is present.   Musculoskeletal:         General: No tenderness. Normal range of motion.      Cervical back: Normal range of motion and neck supple. No tenderness.      Right lower leg: No edema.      Left lower leg: No edema.   Skin:     General: Skin is warm.      Findings: No lesion or rash.   Neurological:      General: No focal deficit present.      Mental Status: She is alert and oriented to person, place, and time.   Psychiatric:         Mood and Affect: Mood normal.         Behavior: Behavior normal.     Imaging:   Pelvic MRI 10/14/24  IMPRESSION:  1.  Endometrial mass with invasion into the myometrium, FIGO stage I.  2. No extra uterine spread of disease.  3. No lymphadenopathy.  4. Multiple uterine fibroids. Exophytic mass along the posterior  aspect of the cervix is distinct from the endometrial mass and  suggestive of a primary cervical lesion such as fibroid.        Performance Status:  Asymptomatic    Assessment/Plan  9 y.o. with Endometrial adenocarcinoma, endometrioid type, FIGO grade 1 p53wt MMRd MLH1 hypermethylation  Comorbidities: obesity (BMI 60), splenic and renal artery aneurysm    Oncology History Overview Note   9/6/24 EMBx Endometrial adenocarcinoma, endometrioid type, FIGO grade 1 p53wt MMRd MLH1 hypermethylation     Endometrial cancer (Multi)   9/27/2024 Initial Diagnosis    Endometrial cancer (Multi)          Diagnoses and all orders for this visit:  Endometrial cancer  - Discussed the significance of histologic subtype, grade and stage  - Reviewed pelvic MRI  with uterine confined disease, myometrial invasion noted   - Reviewed TB recommendation for hormonal therapy due to aneurysms and habitus  - Discussed management options including medical, non-surgical, and surgical options.   - Discussed recommendation for medical management with consideration for PO megace +/- LNG-IUD   - Consider D&C with LNG-IUD placement at 3-6 months, patient preference for OR at this time   - Megace 80 BID sent to the pharmacy     Obesity, class 3  - Consider referral to bariatric medicine on follow up, declines at this time would prefer to try diet and exercise until follow up     RTC 3 months in person    Ksenia Marrufo MD MPH

## 2024-10-24 NOTE — PROGRESS NOTES
"Patient ID: Flaquita Watson is a 69 y.o. female.  Referring Physician: No referring provider defined for this encounter.  Primary Care Provider: MITRA Khoury    Subjective    Here on follow up to discuss MRI. Continues to have vaginal bleeding. Was seen by vascular surgery. Surveillance recommended. Plan for repeat imaging in 6 months. Reports intermittent binge eating with weight gain after weight loss. Ongoing stress related to work and family health, as well as her health.     Objective    BSA: 2.62 meters squared  /78   Pulse 71   Temp 36.4 °C (97.5 °F) (Temporal)   Resp 21   Ht 1.6 m (5' 2.99\")   Wt (!) 154 kg (338 lb 13.6 oz)   SpO2 97%   BMI 60.04 kg/m²      Physical Exam  Vitals and nursing note reviewed. Exam conducted with a chaperone present.   Constitutional:       General: She is not in acute distress.     Appearance: Normal appearance.   HENT:      Head: Normocephalic and atraumatic.      Mouth/Throat:      Mouth: Mucous membranes are moist.      Pharynx: No oropharyngeal exudate or posterior oropharyngeal erythema.   Eyes:      Extraocular Movements: Extraocular movements intact.      Conjunctiva/sclera: Conjunctivae normal.      Pupils: Pupils are equal, round, and reactive to light.   Neck:      Thyroid: No thyroid mass or thyromegaly.   Cardiovascular:      Rate and Rhythm: Normal rate and regular rhythm.      Pulses: Normal pulses.      Heart sounds: Normal heart sounds.   Pulmonary:      Effort: Pulmonary effort is normal.      Breath sounds: Normal breath sounds. No wheezing, rhonchi or rales.   Abdominal:      General: Bowel sounds are normal. There is no distension.      Palpations: Abdomen is soft. There is no mass.      Tenderness: There is no abdominal tenderness.      Hernia: No hernia is present.   Musculoskeletal:         General: No tenderness. Normal range of motion.      Cervical back: Normal range of motion and neck supple. No tenderness.      Right lower leg: " No edema.      Left lower leg: No edema.   Skin:     General: Skin is warm.      Findings: No lesion or rash.   Neurological:      General: No focal deficit present.      Mental Status: She is alert and oriented to person, place, and time.   Psychiatric:         Mood and Affect: Mood normal.         Behavior: Behavior normal.     Imaging:   Pelvic MRI 10/14/24  IMPRESSION:  1.  Endometrial mass with invasion into the myometrium, FIGO stage I.  2. No extra uterine spread of disease.  3. No lymphadenopathy.  4. Multiple uterine fibroids. Exophytic mass along the posterior  aspect of the cervix is distinct from the endometrial mass and  suggestive of a primary cervical lesion such as fibroid.        Performance Status:  Asymptomatic    Assessment/Plan   9 y.o. with Endometrial adenocarcinoma, endometrioid type, FIGO grade 1 p53wt MMRd MLH1 hypermethylation  Comorbidities: obesity (BMI 60), splenic and renal artery aneurysm    Oncology History Overview Note   9/6/24 EMBx Endometrial adenocarcinoma, endometrioid type, FIGO grade 1 p53wt MMRd MLH1 hypermethylation     Endometrial cancer (Multi)   9/27/2024 Initial Diagnosis    Endometrial cancer (Multi)          Diagnoses and all orders for this visit:  Endometrial cancer  - Discussed the significance of histologic subtype, grade and stage  - Reviewed pelvic MRI with uterine confined disease, myometrial invasion noted   - Reviewed TB recommendation for hormonal therapy due to aneurysms and habitus  - Discussed management options including medical, non-surgical, and surgical options.   - Discussed recommendation for medical management with consideration for PO megace +/- LNG-IUD   - Consider D&C with LNG-IUD placement at 3-6 months, patient preference for OR at this time   - Megace 80 BID sent to the pharmacy     Obesity, class 3  - Consider referral to bariatric medicine on follow up, declines at this time would prefer to try diet and exercise until follow up     RTC 3  months in person    Ksenia Marrufo MD MPH

## 2024-10-25 DIAGNOSIS — I72.8: Primary | ICD-10-CM

## 2024-10-28 ENCOUNTER — PATIENT MESSAGE (OUTPATIENT)
Dept: PRIMARY CARE | Facility: CLINIC | Age: 69
End: 2024-10-28
Payer: MEDICARE

## 2024-11-18 ENCOUNTER — APPOINTMENT (OUTPATIENT)
Dept: PRIMARY CARE | Facility: CLINIC | Age: 69
End: 2024-11-18
Payer: MEDICARE

## 2024-11-19 ENCOUNTER — TELEPHONE (OUTPATIENT)
Dept: GYNECOLOGIC ONCOLOGY | Facility: HOSPITAL | Age: 69
End: 2024-11-19
Payer: MEDICARE

## 2024-11-19 NOTE — TELEPHONE ENCOUNTER
Flaquita called and said that she has been having consistent vaginal bleeding since 11/8. She said that her mother passed on 11/8 and she also had difficulty moving her bowels around the same time. She denies any other symptoms, she did say that she has been under a lot of stress since her mother's passing. I told her I update her physician for recommendations.     I spoke Flaquita and told her that Dr. Marrufo recommends increasing her megestrol to 160 mg per day, taking four tablets twice a day. Flaquita agreed with the recommendations.

## 2024-11-19 NOTE — TELEPHONE ENCOUNTER
I am sorry to hear of her loss. Is she having symptoms of anemia (lightheadedness, dizziness, palpitations, shortness of breath)? If not then we can increase the megace to 160 BID (4 tablets 2x daily). If symptomatic will get some labs and based on those may need to see her sooner.

## 2024-11-27 ENCOUNTER — OFFICE VISIT (OUTPATIENT)
Dept: PRIMARY CARE | Facility: CLINIC | Age: 69
End: 2024-11-27
Payer: MEDICARE

## 2024-11-27 ENCOUNTER — TELEPHONE (OUTPATIENT)
Dept: VASCULAR SURGERY | Facility: HOSPITAL | Age: 69
End: 2024-11-27
Payer: MEDICARE

## 2024-11-27 VITALS
HEIGHT: 62 IN | HEART RATE: 70 BPM | BODY MASS INDEX: 53.92 KG/M2 | WEIGHT: 293 LBS | OXYGEN SATURATION: 95 % | DIASTOLIC BLOOD PRESSURE: 67 MMHG | SYSTOLIC BLOOD PRESSURE: 122 MMHG

## 2024-11-27 DIAGNOSIS — M76.892 HAMSTRING TENDONITIS OF LEFT THIGH: ICD-10-CM

## 2024-11-27 DIAGNOSIS — M47.26 OSTEOARTHRITIS OF SPINE WITH RADICULOPATHY, LUMBAR REGION: Primary | ICD-10-CM

## 2024-11-27 DIAGNOSIS — E66.01 MORBID OBESITY WITH BMI OF 50.0-59.9, ADULT (MULTI): ICD-10-CM

## 2024-11-27 PROCEDURE — 1159F MED LIST DOCD IN RCRD: CPT

## 2024-11-27 PROCEDURE — 99213 OFFICE O/P EST LOW 20 MIN: CPT

## 2024-11-27 PROCEDURE — 3008F BODY MASS INDEX DOCD: CPT

## 2024-11-27 PROCEDURE — 1160F RVW MEDS BY RX/DR IN RCRD: CPT

## 2024-11-27 PROCEDURE — 1036F TOBACCO NON-USER: CPT

## 2024-11-27 RX ORDER — CYCLOBENZAPRINE HCL 10 MG
10 TABLET ORAL DAILY PRN
Qty: 30 TABLET | Refills: 0 | Status: SHIPPED | OUTPATIENT
Start: 2024-11-27 | End: 2025-01-26

## 2024-11-27 ASSESSMENT — ENCOUNTER SYMPTOMS
DEPRESSION: 0
OCCASIONAL FEELINGS OF UNSTEADINESS: 0
LOSS OF SENSATION IN FEET: 0

## 2024-11-27 ASSESSMENT — PATIENT HEALTH QUESTIONNAIRE - PHQ9
1. LITTLE INTEREST OR PLEASURE IN DOING THINGS: NOT AT ALL
10. IF YOU CHECKED OFF ANY PROBLEMS, HOW DIFFICULT HAVE THESE PROBLEMS MADE IT FOR YOU TO DO YOUR WORK, TAKE CARE OF THINGS AT HOME, OR GET ALONG WITH OTHER PEOPLE: NOT DIFFICULT AT ALL
SUM OF ALL RESPONSES TO PHQ9 QUESTIONS 1 AND 2: 1
2. FEELING DOWN, DEPRESSED OR HOPELESS: SEVERAL DAYS

## 2024-11-27 NOTE — PROGRESS NOTES
"Primary Care Provider: Marleni Espinoza, APRN-CNP    Subjective   Flaquita Watson is a 69 y.o. female who presents for Follow-up (Left foot,leg, left side in pain).    HPI     Following with OB/GYN for endometrial cancer; following with Dr. Marrufo    11/8/24 mom passed;  grief   Declines resources at this time    Left leg and back pain  Hard to walk- hurts to walk  Radiation down left leg  Chronic pain left knees  No numbness, no tingling, no weakness  Ice  Aleve  Topical lidocaine    10/14/24 MRI Pelvis  Degenerative change in the visualized lower lumbar spine. Spinal canal stenosis lower lumbar spine. Bilateral hamstrings tendinosis with low-grade partial tearing.    8/2/24 CT A/P:  BONES:  No acute osseous abnormalities or suspicious osseous lesions. Multilevel discogenic degenerative changes are visualized throughout the lower thoracic and lumbar spine was scattered intervertebral disc space narrowing and vertebral body osteophytosis.      Obesity; BMI 59.81  Working on weight loss  Cut out pop in her diet  Working on healthier eating  Hard to exercise with her orthopedic pain     No chest pain, no shortness of breath, bowel movements regular, no trouble urinating, energy level is good      Review of Systems  The remainder of the ROS was negative unless otherwise stated in the HPI.       Objective   /67   Pulse 70   Ht 1.575 m (5' 2\")   Wt 148 kg (327 lb)   SpO2 95%   BMI 59.81 kg/m²     Physical Exam  Vitals reviewed.   Constitutional:       General: She is not in acute distress.     Appearance: Normal appearance. She is normal weight. She is not ill-appearing, toxic-appearing or diaphoretic.   HENT:      Head: Normocephalic and atraumatic.   Cardiovascular:      Rate and Rhythm: Normal rate and regular rhythm.      Pulses: Normal pulses.      Heart sounds: Normal heart sounds. No murmur heard.     No friction rub. No gallop.   Pulmonary:      Effort: Pulmonary effort is normal. No respiratory distress. "      Breath sounds: Normal breath sounds.   Abdominal:      General: Abdomen is flat. Bowel sounds are normal.      Palpations: Abdomen is soft.   Musculoskeletal:      Cervical back: Normal range of motion and neck supple.      Lumbar back: Positive left straight leg raise test. Negative right straight leg raise test.   Skin:     General: Skin is warm and dry.      Capillary Refill: Capillary refill takes less than 2 seconds.   Neurological:      General: No focal deficit present.      Mental Status: She is alert and oriented to person, place, and time. Mental status is at baseline.   Psychiatric:         Mood and Affect: Mood normal.         Behavior: Behavior normal.         Thought Content: Thought content normal.         Judgment: Judgment normal.         Assessment/Plan   Problem List Items Addressed This Visit             ICD-10-CM    Hamstring tendonitis of left thigh M76.892    Ice  Tylenol   Topical lidocaine  M. relaxant  Relevant Orders    Referral to Pain Medicine    Referral to Physical Therapy    Disability Placard    Osteoarthritis of spine with radiculopathy, lumbar region - Primary M47.26    Ice  Tylenol   Topical lidocaine  M. relaxant  Relevant Orders    Referral to Pain Medicine    Referral to Physical Therapy    Disability Placard     Other Visit Diagnoses         Codes    Morbid obesity with BMI of 50.0-59.9, adult (Multi)     E66.01, Z68.43    C/w healthier eating  Relevant Orders    Referral to Pain Medicine    Referral to Physical Therapy    Disability Placard              10/14/24 MRI Pelvis  Degenerative change in the visualized lower lumbar spine. Spinal canal stenosis lower lumbar spine. Bilateral hamstrings tendinosis with low-grade partial tearing.    8/2/24 CT A/P:  BONES:  No acute osseous abnormalities or suspicious osseous lesions. Multilevel discogenic degenerative changes are visualized throughout the lower thoracic and lumbar spine was scattered intervertebral disc space  narrowing and vertebral body osteophytosis.      Follow up in 3 months or sooner if needed

## 2024-11-27 NOTE — TELEPHONE ENCOUNTER
Left vmail for patient to do the following:  Schedule a 6 month follow-up appointment with Dr. Dunphy, from the time she saw her last on 10/15.   Schedule her CTA abd/pel to be completed before her appt.   Get lab drawn before her CTA to check kidney function.   4.  All orders are in.     Instructed her to call our office should she have any questions.     Nancie Sheth, APRN-CNP  Vascular Surgery

## 2024-12-03 ENCOUNTER — APPOINTMENT (OUTPATIENT)
Dept: PRIMARY CARE | Facility: CLINIC | Age: 69
End: 2024-12-03
Payer: MEDICARE

## 2024-12-30 ENCOUNTER — PATIENT MESSAGE (OUTPATIENT)
Dept: PRIMARY CARE | Facility: CLINIC | Age: 69
End: 2024-12-30
Payer: MEDICARE

## 2025-01-06 DIAGNOSIS — M47.26 OSTEOARTHRITIS OF SPINE WITH RADICULOPATHY, LUMBAR REGION: ICD-10-CM

## 2025-01-06 DIAGNOSIS — M76.892 HAMSTRING TENDONITIS OF LEFT THIGH: ICD-10-CM

## 2025-01-06 RX ORDER — CYCLOBENZAPRINE HCL 10 MG
10 TABLET ORAL DAILY PRN
Qty: 30 TABLET | Refills: 0 | Status: SHIPPED | OUTPATIENT
Start: 2025-01-06 | End: 2025-03-07

## 2025-01-09 ENCOUNTER — PREP FOR PROCEDURE (OUTPATIENT)
Dept: OPERATING ROOM | Facility: HOSPITAL | Age: 70
End: 2025-01-09

## 2025-01-09 ENCOUNTER — OFFICE VISIT (OUTPATIENT)
Dept: GYNECOLOGIC ONCOLOGY | Facility: HOSPITAL | Age: 70
End: 2025-01-09
Payer: MEDICARE

## 2025-01-09 VITALS
TEMPERATURE: 97.5 F | DIASTOLIC BLOOD PRESSURE: 104 MMHG | SYSTOLIC BLOOD PRESSURE: 163 MMHG | WEIGHT: 293 LBS | BODY MASS INDEX: 58.89 KG/M2 | HEART RATE: 111 BPM

## 2025-01-09 DIAGNOSIS — C54.1 ENDOMETRIAL CANCER (MULTI): Primary | ICD-10-CM

## 2025-01-09 DIAGNOSIS — E66.813 CLASS 3 SEVERE OBESITY WITH SERIOUS COMORBIDITY AND BODY MASS INDEX (BMI) OF 60.0 TO 69.9 IN ADULT, UNSPECIFIED OBESITY TYPE: ICD-10-CM

## 2025-01-09 DIAGNOSIS — E66.01 CLASS 3 SEVERE OBESITY WITH SERIOUS COMORBIDITY AND BODY MASS INDEX (BMI) OF 60.0 TO 69.9 IN ADULT, UNSPECIFIED OBESITY TYPE: ICD-10-CM

## 2025-01-09 PROCEDURE — 99214 OFFICE O/P EST MOD 30 MIN: CPT | Performed by: STUDENT IN AN ORGANIZED HEALTH CARE EDUCATION/TRAINING PROGRAM

## 2025-01-09 RX ORDER — ACETAMINOPHEN 325 MG/1
975 TABLET ORAL ONCE
OUTPATIENT
Start: 2025-01-09 | End: 2025-01-09

## 2025-01-09 ASSESSMENT — PATIENT HEALTH QUESTIONNAIRE - PHQ9
SUM OF ALL RESPONSES TO PHQ9 QUESTIONS 1 & 2: 0
1. LITTLE INTEREST OR PLEASURE IN DOING THINGS: NOT AT ALL
2. FEELING DOWN, DEPRESSED OR HOPELESS: NOT AT ALL

## 2025-01-09 ASSESSMENT — PAIN SCALES - GENERAL: PAINLEVEL_OUTOF10: 0-NO PAIN

## 2025-01-09 NOTE — PROGRESS NOTES
Patient ID: Flaquita Watson is a 69 y.o. female.  Referring Physician: No referring provider defined for this encounter.  Primary Care Provider: MITRA Khoury    Subjective    Here on follow up with endometrial cancer currently on megace 160 BID. Here to discuss endometrial biopsy +/- LNG-IUD placement.     She reports she is still bleeding though it has improved.  Not passing clots but heavier than spotting.  It is intermittent, has some days of bleeding and some days with none.      She is consider IUD placement but would strongly prefer this to be under anesthesia.    Objective    BSA: 2.53 meters squared  BP (!) 163/104 (BP Location: Right arm, Patient Position: Sitting, BP Cuff Size: Large adult)   Pulse (!) 111   Temp 36.4 °C (97.5 °F)   Wt 146 kg (322 lb)   BMI 58.89 kg/m²      Physical Exam  Vitals and nursing note reviewed. Exam conducted with a chaperone present.   Constitutional:       General: She is not in acute distress.     Appearance: Normal appearance.   HENT:      Head: Normocephalic and atraumatic.      Mouth/Throat:      Mouth: Mucous membranes are moist.      Pharynx: No oropharyngeal exudate or posterior oropharyngeal erythema.   Eyes:      Extraocular Movements: Extraocular movements intact.      Conjunctiva/sclera: Conjunctivae normal.      Pupils: Pupils are equal, round, and reactive to light.   Neck:      Thyroid: No thyroid mass or thyromegaly.   Cardiovascular:      Rate and Rhythm: Normal rate and regular rhythm.      Pulses: Normal pulses.      Heart sounds: Normal heart sounds.   Pulmonary:      Effort: Pulmonary effort is normal.      Breath sounds: Normal breath sounds. No wheezing, rhonchi or rales.   Abdominal:      General: Bowel sounds are normal. There is no distension.      Palpations: Abdomen is soft. There is no mass.      Tenderness: There is no abdominal tenderness.      Hernia: No hernia is present.   Musculoskeletal:         General: No tenderness. Normal  range of motion.      Cervical back: Normal range of motion and neck supple. No tenderness.      Right lower leg: No edema.      Left lower leg: No edema.   Skin:     General: Skin is warm.      Findings: No lesion or rash.   Neurological:      General: No focal deficit present.      Mental Status: She is alert and oriented to person, place, and time.   Psychiatric:         Mood and Affect: Mood normal.         Behavior: Behavior normal.     Imaging:   Pelvic MRI 10/14/24  IMPRESSION:  1.  Endometrial mass with invasion into the myometrium, FIGO stage I.  2. No extra uterine spread of disease.  3. No lymphadenopathy.  4. Multiple uterine fibroids. Exophytic mass along the posterior  aspect of the cervix is distinct from the endometrial mass and  suggestive of a primary cervical lesion such as fibroid.        Performance Status:  Asymptomatic    Assessment/Plan   69 y.o. with Endometrial adenocarcinoma, endometrioid type, FIGO grade 1 p53wt MMRd MLH1 hypermethylation  Comorbidities: obesity (BMI 60), splenic and renal artery aneurysm    Oncology History Overview Note   9/6/24 EMBx Endometrial adenocarcinoma, endometrioid type, FIGO grade 1 p53wt MMRd MLH1 hypermethylation     Endometrial cancer (Multi)   9/27/2024 Initial Diagnosis    Endometrial cancer (Multi)          Diagnoses and all orders for this visit:  Endometrial cancer  - Discussed the significance of histologic subtype, grade and stage  - Reviewed pelvic MRI with uterine confined disease, myometrial invasion noted   - Reviewed TB recommendation for hormonal therapy due to aneurysms and habitus  - Continue PO megace   - Recommend D&C with LNG-IUD, patient desires OR  - Will need PAT    Obesity, class 3  - Declines bariatric medicine referral  - Has lost 20lbs since last visit, congratulated, encouraged to continue     Renal and splenic artery aneurysms  - Planning to follow with CT    RTC 3 months in person    Macario Ch MD  Seen with Dr. Yaron RALPH  saw and evaluated the patient. I personally obtained the key and critical portions of the history and physical exam or was physically present for key and critical portions performed by the resident/fellow. I reviewed the resident/fellow's documentation and discussed the patient with the resident/fellow. I agree with the resident/fellow's medical decision making as documented in the note.    Ksenia Marrufo MD MPH

## 2025-01-16 ENCOUNTER — APPOINTMENT (OUTPATIENT)
Dept: GYNECOLOGIC ONCOLOGY | Facility: HOSPITAL | Age: 70
End: 2025-01-16
Payer: MEDICARE

## 2025-01-16 ENCOUNTER — APPOINTMENT (OUTPATIENT)
Dept: PAIN MEDICINE | Facility: CLINIC | Age: 70
End: 2025-01-16
Payer: MEDICARE

## 2025-01-27 ENCOUNTER — EVALUATION (OUTPATIENT)
Dept: PHYSICAL THERAPY | Facility: CLINIC | Age: 70
End: 2025-01-27
Payer: MEDICARE

## 2025-01-27 ENCOUNTER — APPOINTMENT (OUTPATIENT)
Dept: PHYSICAL THERAPY | Facility: CLINIC | Age: 70
End: 2025-01-27

## 2025-01-27 DIAGNOSIS — M54.50 LUMBAGO: ICD-10-CM

## 2025-01-27 DIAGNOSIS — M47.26 OSTEOARTHRITIS OF SPINE WITH RADICULOPATHY, LUMBAR REGION: ICD-10-CM

## 2025-01-27 DIAGNOSIS — E66.01 MORBID OBESITY WITH BMI OF 50.0-59.9, ADULT (MULTI): ICD-10-CM

## 2025-01-27 DIAGNOSIS — M76.892 HAMSTRING TENDONITIS OF LEFT THIGH: Primary | ICD-10-CM

## 2025-01-27 PROCEDURE — 97163 PT EVAL HIGH COMPLEX 45 MIN: CPT | Mod: GP

## 2025-01-27 ASSESSMENT — ENCOUNTER SYMPTOMS
OCCASIONAL FEELINGS OF UNSTEADINESS: 1
DEPRESSION: 1
LOSS OF SENSATION IN FEET: 0

## 2025-01-27 ASSESSMENT — PAIN - FUNCTIONAL ASSESSMENT: PAIN_FUNCTIONAL_ASSESSMENT: 0-10

## 2025-01-27 ASSESSMENT — PAIN SCALES - GENERAL: PAINLEVEL_OUTOF10: 4

## 2025-01-27 NOTE — PROGRESS NOTES
Physical Therapy    Physical Therapy Evaluation    Patient Name: Flaquita Watson  MRN: [id number]  Today's Date: 1/27/2025    Time Entry:  Time Calculation  Start Time: 1515  Stop Time: 1600  Time Calculation (min): 45 min  PT Evaluation Time Entry  PT Evaluation (Complex) Time Entry: 45                    Visit #1  Insurance; Medicare  Cert; 1/27/2025 - 4/24/2025  Problem List Items Addressed This Visit             ICD-10-CM       Musculoskeletal and Injuries    Hamstring tendonitis of left thigh - Primary M76.892    Relevant Orders    Follow Up In Physical Therapy    Lumbago M54.50    Relevant Orders    Follow Up In Physical Therapy       Neuro    Osteoarthritis of spine with radiculopathy, lumbar region M47.26    Relevant Orders    Follow Up In Physical Therapy     Other Visit Diagnoses         Codes    Morbid obesity with BMI of 50.0-59.9, adult (Multi)     E66.01, Z68.43            Assessment  Patient presents with chronic minor lower back pain and new episode of gradually increasing left Hamstrings pain, which has lasted for about 3 months. Patient struggled ambulating and forced to start using rolling walker for mobility. Standing reduced due to more left leg pain with WB. Patient had to change life style and often needs children to come over and help wash dishes. Patient ordering food instead cooking due to inability to endure standing. Patient diagnosed with cancer of endometrium, and scheduled for DNC and biopsy for 2/18/2025. Currently taking Megastrol (8 pills a day- to assist with vaginal bleeding).  Patient diagnosed in September. Patient had fall in later September. Fall caused by tripping forward by stepping on long dress. Since then, pain in back of left leg has been persistent. Provisional classification is Hamstrings strain. Patient will benefit from skilled PT in improving left leg strength, normalizing mobility, and reducing Hamstrings pain.     Plan  Treatment/Interventions: Education/ Instruction,  Gait training, Manual therapy, Therapeutic exercises  PT Plan: Skilled PT  PT Frequency:  (Recommending 1-2 sessions a week x total of 10 sessions)  Certification Period Start Date: 01/27/25  Certification Period End Date: 04/24/25  Rehab Potential: Good  Plan of Care Agreement: Patient    Current Problem  1. Hamstring tendonitis of left thigh  Referral to Physical Therapy    Follow Up In Physical Therapy      2. Osteoarthritis of spine with radiculopathy, lumbar region  Referral to Physical Therapy    Follow Up In Physical Therapy      3. Morbid obesity with BMI of 50.0-59.9, adult (Multi)  Referral to Physical Therapy      4. Lumbago  Follow Up In Physical Therapy          Subjective   General:  General  Reason for Referral: PT eval and treat; lower back  Referred By: Marleni Espinoza CNP  Past Medical History Relevant to Rehab: Hx of lower back pain and intense onset of left Hamstrings pain about 3 months ago. Pain got worse over time and I could barely walk (I was given lidocaine, muscle relaxers and I was taking Aleve and ice at home)  General Comment: On the walker about November 12 -13th. I started using walker because of left Hamstrings pain  Precautions: fall precautions addressed by consistent use of walker  Precautions  STEADI Fall Risk Score (The score of 4 or more indicates an increased risk of falling): 10  Vital Signs: NA     Pain:  Pain Assessment: 0-10  0-10 (Numeric) Pain Score: 4  Pain Type: Chronic pain  Pain Location:  (pain starts bellow butt cheek and travels in the back of my leg to sides of my left knee)  Pain Orientation: Left  Pain Descriptors:  (ache, throb, shooting and stabbing pain)  Pain Frequency: Constant/continuous  Pain Onset: Gradual  Clinical Progression:  (Just recently feeling better)  Effect of Pain on Daily Activities: Pain effects walking and I am force to rely on walker (Tried walking without it for about 5 minutes, but pain came back)  Patient's Stated Pain Goal:  (Less  pain and easier mobility without assistance)  Pain Interventions: Home medication, Cold applied, Medication (See MAR)  Response to Interventions:  (I have not take muscle relaxers lately, but I am starting to feel better)  Home Living: live alone  Apartment with no stairs     Prior Function Per Pt/Caregiver Report: drives, works as , uses rolling walker for mobility since late November       Objective   Posture: WFL     Range of Motion: lumbar  Flexion WNL  Extension - moderate loss  Lateral flexion - mid loss                               Left hip flexion/extension WNL     Strength: hip  Left hip flexion 2+/5  Left hip abd/adduction 4-/5  Left hip extension4-/5  Left Hamstrings 3-/5     Flexibility: NA     Palpation: tenderness present to touch of posterior left leg     Special Tests:     Gait: slow linda, short steps, rolling walker dependent  Occasional stop and go due to posterior left leg pain     Balance: fair -   Currently depends on walker due to pain and weakness of left leg     Stairs: no stairs at home (stair climbing not assessed)     Outcome Measures:  Modified Oswestry =  34%    OP EDUCATION:  Outpatient Education  Individual(s) Educated: Patient  Education Provided: Body Mechanics, Anatomy, Fall Risk, Home Exercise Program, POC, Posture  Diagnosis and Precautions: lower back pain, left lumbar radiculopathy  Risk and Benefits Discussed with Patient/Caregiver/Other: yes  Patient/Caregiver Demonstrated Understanding: yes  Plan of Care Discussed and Agreed Upon: yes  Patient Response to Education: Patient/Caregiver Verbalized Understanding of Information  Education Comment: POC and HEP configuration    PT intervention; Evaluation only at this visit    Goals:  Active       PT Problem       PT Goal 1       Start:  01/27/25    Expected End:  04/24/25       Patient will reduce difficulties related to functional mobilities and quality of life, as evident by Modified Oswestry score  reduction by 30%         PT Goal 2       Start:  01/27/25    Expected End:  04/24/25       Patient will report reduced/abolished pain in left posterior thigh, indicated by grade of 0-2 on 0-10 pain scale during normal daily ambulation with or without assistive device         PT Goal 3       Start:  01/27/25    Expected End:  04/24/25       Achieve safe arm assist transfers sit/stand on first attempt, and score 4 or better on 30 seconds sit/stand test         PT Goal 4       Start:  01/27/25    Expected End:  04/24/25       Demo functional ambulation tolerance, evident by 6 MWT over 700 feet with or without assistive device          PT Goal 5       Start:  01/27/25    Expected End:  04/24/25       Patient will demonstrated knowledge of HEP, its maintenance frequency, and associated benefits

## 2025-01-30 ENCOUNTER — APPOINTMENT (OUTPATIENT)
Dept: PREADMISSION TESTING | Facility: HOSPITAL | Age: 70
End: 2025-01-30
Payer: MEDICARE

## 2025-01-30 NOTE — CPM/PAT H&P
CPM/PAT Evaluation       Name: Flaquita Watson (Flaquita Watson)  /Age: 1955/69 y.o.     {[facility] Visit Type:02625}      Chief Complaint: ***    HPI    Flaquita Watson is scheduled for DILATION AND CURETTAGE, UTERUS, DIAGNOSTIC Mirena IUD placement on 25    **Virtual Visit  Past Medical History:   Diagnosis Date    Dizziness 2024    Endometrial cancer (Multi)     Fall 2024    Obesity        Past Surgical History:   Procedure Laterality Date    CHOLECYSTECTOMY      abdominal    HERNIA REPAIR      inguinal       Patient  reports that she is not currently sexually active. She reports using the following method of birth control/protection: Post-menopausal.    Family History   Problem Relation Name Age of Onset    COPD Mother      Stomach cancer Mother      Other (ESRD) Mother      Breast cancer Mother  46    Hypertension Mother      Thyroid disease Father      Heart failure Father      Other (Bladder Cancer) Sister  52    Diabetes type II Daughter      Breast cancer Maternal Grandmother         Allergies   Allergen Reactions    Iodine Itching    Penicillins Other    Sulfa (Sulfonamide Antibiotics) Other       Prior to Admission medications    Medication Sig Start Date End Date Taking? Authorizing Provider   albuterol 90 mcg/actuation inhaler Inhale 2 puffs every 4 hours if needed.  Patient not taking: Reported on 2025   Historical Provider, MD   cyclobenzaprine (Flexeril) 10 mg tablet TAKE 1 TABLET (10 MG) BY MOUTH ONCE DAILY AS NEEDED FOR MUSCLE SPASMS. CAUTION, MAY CAUSE DROWSINESS. 1/6/25 3/7/25  MarleniMITRA Jones   lidocaine (Lidoderm) 5 % patch Place 1 patch over 12 hours on the skin once daily. Apply to painful area 12 hours per day, remove for 12 hours. 1/10/25 2/9/25  MarleniVANESSA Jones-CNP   megestrol (Megace) 40 mg tablet Take 2 tablets (80 mg total) by mouth 2 times a day. 10/24/24 10/24/25  Olga Bergeron MD        [facility] Physical Exam      Airway        Testing/Diagnostic:         - EK/10/22, see Care Everywhere  NORMAL SINUS RHYTHM   NONSPECIFIC T WAVE ABNORMALITY   ABNORMAL ECG     - MR Pelvis: 10/14/24  IMPRESSION:  1.  Endometrial mass with invasion into the myometrium, FIGO stage I.  2. No extra uterine spread of disease.  3. No lymphadenopathy.  4. Multiple uterine fibroids. Exophytic mass along the posterior  aspect of the cervix is distinct from the endometrial mass and  suggestive of a primary cervical lesion such as fibroid.      - CT Chest A/P: 10/14/24  IMPRESSION:  1. Re-demonstration of peripherally calcified saccular aneurysms of the bilateral renal arteries and distal splenic artery.  2. Moderate-sized ventral hernia containing intra-abdominal fat and a nondilated loop of transverse colon.  3. Prominence of the endometrial cavity however evaluation is limited secondary to streak artifact, correlate with same day dedicated MR pelvis.  4. Cholelithiasis without evidence of cholecystitis.        - CT Head: 24  IMPRESSION:  No depressed skull fracture. No acute intracranial bleed or focal  mass effect.      Findings of acute left maxillary sinusitis and chronic left sphenoid  sinusitis.      Mild-to-moderate nonspecific patchy white matter hypodensities as described. Chronic white matter ischemic disease versus nonspecific demyelinating disorder.      Patient Specialist/PCP:       Vascular: Kaitlyn Dunphy 10/15/24 evaluation of visceral artery aneurysms. She states that she is known about these from previous CT scans that she has had.    CTA performed 10/2024.  She does still have a 1.8 cm splenic artery aneurysm.  She has a 2.9 cm right renal artery aneurysm and a 1.4 cm left renal artery aneurysm.     Per note:  #Left renal artery aneurysm, 1.4 cm  - This aneurysm has grown since her previous CT scan in .  However it does not meet criteria for repair.  Will continue to monitor with surveillance.     #Splenic artery  aneurysm, 1.5 cm  - This aneurysm does not meet criteria for repair.  We will continue to monitor with surveillance.      PCP: Marleni Espinoza CNP 11/27/24 Follow-up (Left foot,leg, left side in pain). Hx of obesity, endometrial cancer      Gyn Onc: Ksenia Yaron 01/09/25 follow up with endometrial cancer (clinical stage IA G1)per biopsy on 9/6/24 currently on megace 160 BID. reports she is still bleeding though it has improved. She is consider IUD placement but would strongly prefer this to be under anesthesia.     Pelvic MRI 10/14/24  IMPRESSION:  1.  Endometrial mass with invasion into the myometrium, FIGO stage I.  2. No extra uterine spread of disease.  3. No lymphadenopathy.  4. Multiple uterine fibroids. Exophytic mass along the posterior  aspect of the cervix is distinct from the endometrial mass and  suggestive of a primary cervical lesion such as fibroid.          ____________________________________________________  Medication instructions:   Instructed to hold Vitamins, Supplements and Ibuprofen 7 days prior to surgery            Jaimie Philippe LPN  Preadmission Testing              Visit Vitals  OB Status Postmenopausal   Smoking Status Never       DASI Risk Score    No data to display       Caprini DVT Assessment    No data to display       Modified Frailty Index    No data to display       CHADS2 Stroke Risk  Current as of yesterday        N/A 3 to 100%: High Risk   2 to < 3%: Medium Risk   0 to < 2%: Low Risk     Last Change: N/A          This score determines the patient's risk of having a stroke if the patient has atrial fibrillation.        This score is not applicable to this patient. Components are not calculated.          Revised Cardiac Risk Index    No data to display       Apfel Simplified Score    No data to display       Risk Analysis Index Results This Encounter    No data found in the last 10 encounters.       Prodigy: High Risk  Total Score: 8              Prodigy Age Score            ARISCAT Score for Postoperative Pulmonary Complications    No data to display       Pan Perioperative Risk for Myocardial Infarction or Cardiac Arrest (RYLEE)    No data to display         Assessment and Plan:     {[facility] EMBEDDED ASSESSMENT AND PLAN:93580}

## 2025-01-31 ENCOUNTER — CLINICAL SUPPORT (OUTPATIENT)
Dept: PREADMISSION TESTING | Facility: HOSPITAL | Age: 70
End: 2025-01-31
Payer: MEDICARE

## 2025-01-31 RX ORDER — NAPROXEN SODIUM 220 MG
220 TABLET ORAL
COMMUNITY

## 2025-01-31 ASSESSMENT — DUKE ACTIVITY SCORE INDEX (DASI)
CAN YOU PARTICIPATE IN STRENOUS SPORTS LIKE SWIMMING, SINGLES TENNIS, FOOTBALL, BASKETBALL, OR SKIING: YES
CAN YOU HAVE SEXUAL RELATIONS: YES
CAN YOU DO HEAVY WORK AROUND THE HOUSE LIKE SCRUBBING FLOORS OR LIFTING AND MOVING HEAVY FURNITURE: YES
CAN YOU DO LIGHT WORK AROUND THE HOUSE LIKE DUSTING OR WASHING DISHES: YES
CAN YOU CLIMB A FLIGHT OF STAIRS OR WALK UP A HILL: YES
CAN YOU WALK INDOORS, SUCH AS AROUND YOUR HOUSE: YES
CAN YOU DO MODERATE WORK AROUND THE HOUSE LIKE VACUUMING, SWEEPING FLOORS OR CARRYING GROCERIES: YES
DASI METS SCORE: 9.9
CAN YOU PARTICIPATE IN MODERATE RECREATIONAL ACTIVITIES LIKE GOLF, BOWLING, DANCING, DOUBLES TENNIS OR THROWING A BASEBALL OR FOOTBALL: YES
CAN YOU TAKE CARE OF YOURSELF (EAT, DRESS, BATHE, OR USE TOILET): YES
CAN YOU RUN A SHORT DISTANCE: YES
CAN YOU DO YARD WORK LIKE RAKING LEAVES, WEEDING OR PUSHING A MOWER: YES
CAN YOU WALK A BLOCK OR TWO ON LEVEL GROUND: YES
TOTAL_SCORE: 58.2

## 2025-01-31 ASSESSMENT — ENCOUNTER SYMPTOMS
NEUROLOGICAL NEGATIVE: 1
GASTROINTESTINAL NEGATIVE: 1
CONSTITUTIONAL NEGATIVE: 1
EYES NEGATIVE: 1
CARDIOVASCULAR NEGATIVE: 1
MUSCULOSKELETAL NEGATIVE: 1
ENDOCRINE NEGATIVE: 1
RESPIRATORY NEGATIVE: 1
NECK NEGATIVE: 1

## 2025-01-31 NOTE — SIGNIFICANT EVENT
01/31/25 1444   DASI Activity Score Index   Can you take care of yourself (eat, dress, bathe, or use toilet)?  2.75   Can you walk indoors, such as around your house? 1.75   Can you walk a block or two on level ground?  2.75   Can you climb a flight of stairs or walk up a hill? 5.5   Can you run a short distance? 8   Can you do light work around the house like dusting or washing dishes? 2.7   Can you do moderate work around the house like vacuuming, sweeping floors or carrying groceries? 3.5   Can you do heavy work around the house like scrubbing floors or lifting and moving heavy furniture?  8   Can you do yard work like raking leaves, weeding or pushing a mower? 4.5   Can you have sexual relations? 5.25   Can you participate in moderate recreational activities like golf, bowling, dancing, doubles tennis or throwing a baseball or football? 6   Can you participate in strenous sports like swimming, singles tennis, football, basketball, or skiing? 7.5   DASI SCORE 58.2   METS Score (Will be calculated only when all the questions are answered) 9.9

## 2025-01-31 NOTE — CPM/PAT H&P
[facility]/PAT Evaluation       Name: Flaquita Watson (Flaquita Watson)  /Age: 1955/69 y.o.     {[facility] Visit Type:56268}      Chief Complaint: ***    HPI    Flaquita Watson is scheduled for DILATION AND CURETTAGE, UTERUS, DIAGNOSTIC Mirena IUD placement on 25    **Virtual Visit  Past Medical History:   Diagnosis Date   • Abnormal EKG     5/10/22--NORMAL SINUS RHYTHM  NONSPECIFIC T WAVE ABNORMALITY  ABNORMAL ECG   • Anxiety    • Arthritis    • Cholelithiasis    • Depression     grief to recent lost of mother   • Dizziness 2024   • Dysfunctional uterine bleeding    • Endometrial cancer (Multi)     Seen by Dr. Ksenia Marrufo 25,On Megace   • Fall 2024   • GERD (gastroesophageal reflux disease)    • Obesity    • Renal arterial aneurysm (CMS-HCC)     Left renal artery aneurysm, 1.4 cm- and 2.9 cm right renal artery aneurysm   • Splenic artery aneurysm (CMS-HCC)     10/2024--Splenic artery aneurysm, 1.5 cm   • Ventral hernia     10/2024 CT CAP--Moderate-sized ventral hernia       Past Surgical History:   Procedure Laterality Date   • GALLBLADDER SURGERY     • HERNIA REPAIR      inguinal       Patient  reports that she is not currently sexually active. She reports using the following method of birth control/protection: Post-menopausal.    Family History   Problem Relation Name Age of Onset   • COPD Mother     • Stomach cancer Mother     • Other (ESRD) Mother     • Breast cancer Mother  46   • Hypertension Mother     • Thyroid disease Father     • Heart failure Father     • Other (Bladder Cancer) Sister  52   • Diabetes type II Daughter     • Breast cancer Maternal Grandmother         Allergies   Allergen Reactions   • Iodine Itching   • Penicillins Itching   • Sulfa (Sulfonamide Antibiotics) Other       Prior to Admission medications    Medication Sig Start Date End Date Taking? Authorizing Provider   albuterol 90 mcg/actuation inhaler Inhale 2 puffs every 4 hours if needed.  Patient not taking: Reported on  2025   Historical Provider, MD   cyclobenzaprine (Flexeril) 10 mg tablet TAKE 1 TABLET (10 MG) BY MOUTH ONCE DAILY AS NEEDED FOR MUSCLE SPASMS. CAUTION, MAY CAUSE DROWSINESS. 1/6/25 3/7/25  MITRA hKoury   lidocaine (Lidoderm) 5 % patch Place 1 patch over 12 hours on the skin once daily. Apply to painful area 12 hours per day, remove for 12 hours. 1/10/25 2/9/25  MITRA Khoury   megestrol (Megace) 40 mg tablet Take 2 tablets (80 mg total) by mouth 2 times a day. 10/24/24 10/24/25  Olga Bergeron MD        PAT ROS:   Constitutional:   neg    Neuro/Psych:   neg    Eyes:   neg    Ears:   neg    Nose:   neg    Mouth:   neg    Throat:   neg    Neck:   neg    Cardio:   neg    Respiratory:   neg    Endocrine:   neg    GI:   neg    :   neg    Musculoskeletal:   neg    Hematologic:   neg    Skin:  neg        PAT Physical Exam     Airway        Testing/Diagnostic:         - EK/10/22, see Care Everywhere  NORMAL SINUS RHYTHM   NONSPECIFIC T WAVE ABNORMALITY   ABNORMAL ECG       - MR Pelvis: 10/14/24  IMPRESSION:  1.  Endometrial mass with invasion into the myometrium, FIGO stage I.  2. No extra uterine spread of disease.  3. No lymphadenopathy.  4. Multiple uterine fibroids. Exophytic mass along the posterior  aspect of the cervix is distinct from the endometrial mass and  suggestive of a primary cervical lesion such as fibroid.      - CT Chest A/P: 10/14/24  IMPRESSION:  1. Re-demonstration of peripherally calcified saccular aneurysms of the bilateral renal arteries and distal splenic artery.  2. Moderate-sized ventral hernia containing intra-abdominal fat and a nondilated loop of transverse colon.  3. Prominence of the endometrial cavity however evaluation is limited secondary to streak artifact, correlate with same day dedicated MR pelvis.  4. Cholelithiasis without evidence of cholecystitis.      - METS: 9.9    - CT Head: 24  IMPRESSION:  No depressed skull fracture. No  acute intracranial bleed or focal  mass effect.      Findings of acute left maxillary sinusitis and chronic left sphenoid  sinusitis.      Mild-to-moderate nonspecific patchy white matter hypodensities as described. Chronic white matter ischemic disease versus nonspecific demyelinating disorder.      Patient Specialist/PCP:         Vascular: Kaitlyn Dunphy 10/15/24 evaluation of visceral artery aneurysms. She states that she is known about these from previous CT scans that she has had.    CTA performed 10/2024.  She does still have a 1.8 cm splenic artery aneurysm.  She has a 2.9 cm right renal artery aneurysm and a 1.4 cm left renal artery aneurysm.     Per note:  #Left renal artery aneurysm, 1.4 cm  - This aneurysm has grown since her previous CT scan in 2022.  However it does not meet criteria for repair.  Will continue to monitor with surveillance.     #Splenic artery aneurysm, 1.5 cm  - This aneurysm does not meet criteria for repair.  We will continue to monitor with surveillance.      PCP: Marleni Espinoza CNP 11/27/24 Follow-up (Left foot,leg, left side in pain). Hx of obesity, endometrial cancer      Gyn Onc: Ksenia Yaron 01/09/25 follow up with endometrial cancer (clinical stage IA G1)per biopsy on 9/6/24 currently on megace 160 BID. reports she is still bleeding though it has improved. She is consider IUD placement but would strongly prefer this to be under anesthesia.     Pelvic MRI 10/14/24  IMPRESSION:  1.  Endometrial mass with invasion into the myometrium, FIGO stage I.  2. No extra uterine spread of disease.  3. No lymphadenopathy.  4. Multiple uterine fibroids. Exophytic mass along the posterior  aspect of the cervix is distinct from the endometrial mass and  suggestive of a primary cervical lesion such as fibroid.          ____________________________________________________  Medication instructions:   Instructed to hold Vitamins, Supplements and Ibuprofen 7 days prior to surgery            Jaimie  MARIA DE JESUS Philippe  Preadmission Testing              Visit Vitals  OB Status Postmenopausal   Smoking Status Never       DASI Risk Score      Flowsheet Row Clinical Support from 1/31/2025 in [facility]   Can you take care of yourself (eat, dress, bathe, or use toilet)?  2.75 (P)  filed at 01/31/2025 1444   Can you walk indoors, such as around your house? 1.75 (P)  filed at 01/31/2025 1444   Can you walk a block or two on level ground?  2.75 (P)  filed at 01/31/2025 1444   Can you climb a flight of stairs or walk up a hill? 5.5 (P)  filed at 01/31/2025 1444   Can you run a short distance? 8 (P)  filed at 01/31/2025 1444   Can you do light work around the house like dusting or washing dishes? 2.7 (P)  filed at 01/31/2025 1444   Can you do moderate work around the house like vacuuming, sweeping floors or carrying groceries? 3.5 (P)  filed at 01/31/2025 1444   Can you do heavy work around the house like scrubbing floors or lifting and moving heavy furniture?  8 (P)  filed at 01/31/2025 1444   Can you do yard work like raking leaves, weeding or pushing a mower? 4.5 (P)  filed at 01/31/2025 1444   Can you have sexual relations? 5.25 (P)  filed at 01/31/2025 1444   Can you participate in moderate recreational activities like golf, bowling, dancing, doubles tennis or throwing a baseball or football? 6 (P)  filed at 01/31/2025 1444   Can you participate in strenous sports like swimming, singles tennis, football, basketball, or skiing? 7.5 (P)  filed at 01/31/2025 1444   DASI SCORE 58.2 (P)  filed at 01/31/2025 1444   METS Score (Will be calculated only when all the questions are answered) 9.9 (P)  filed at 01/31/2025 1444          Caprini DVT Assessment    No data to display       Modified Frailty Index    No data to display       CHADS2 Stroke Risk  Current as of 24 minutes ago        N/A 3 to 100%: High Risk   2 to < 3%: Medium Risk   0 to < 2%: Low Risk     Last Change: N/A          This score determines the  patient's risk of having a stroke if the patient has atrial fibrillation.        This score is not applicable to this patient. Components are not calculated.          Revised Cardiac Risk Index    No data to display       Apfel Simplified Score    No data to display       Risk Analysis Index Results This Encounter    No data found in the last 10 encounters.       Prodigy: High Risk  Total Score: 8              Prodigy Age Score           ARISCAT Score for Postoperative Pulmonary Complications    No data to display       Pan Perioperative Risk for Myocardial Infarction or Cardiac Arrest (RYLEE)    No data to display         Assessment and Plan:     {[facility] EMBEDDED ASSESSMENT AND PLAN:16894}

## 2025-02-06 ENCOUNTER — TELEMEDICINE CLINICAL SUPPORT (OUTPATIENT)
Dept: PREADMISSION TESTING | Facility: HOSPITAL | Age: 70
End: 2025-02-06
Payer: MEDICARE

## 2025-02-06 DIAGNOSIS — C54.1 ENDOMETRIAL CANCER (MULTI): ICD-10-CM

## 2025-02-06 PROCEDURE — 99422 OL DIG E/M SVC 11-20 MIN: CPT | Performed by: NURSE PRACTITIONER

## 2025-02-06 ASSESSMENT — ENCOUNTER SYMPTOMS
CONSTITUTIONAL NEGATIVE: 1
CARDIOVASCULAR NEGATIVE: 1
GASTROINTESTINAL NEGATIVE: 1
NECK NEGATIVE: 1
NEUROLOGICAL NEGATIVE: 1
RESPIRATORY NEGATIVE: 1
EYES NEGATIVE: 1
ENDOCRINE NEGATIVE: 1
MUSCULOSKELETAL NEGATIVE: 1

## 2025-02-06 ASSESSMENT — LIFESTYLE VARIABLES: SMOKING_STATUS: NONSMOKER

## 2025-02-06 NOTE — CPM/PAT H&P
CPM/PAT Evaluation       Name: Flaquita Watson (Flaquita Watson)  /Age: 1955/69 y.o.     Visit Type:   Virtual Visit     Virtual or Telephone Consent    An interactive audio and video telecommunication system which permits real time communications between the patient (at the originating site) and provider (at the distant site) was utilized to provide this telehealth service.   Verbal consent was requested and obtained from Flaquita Watson on this date, 25 for a telehealth visit.      Chief Complaint: perioperative evaluation      HPI    Flaquita Watson is scheduled for DILATION AND CURETTAGE, UTERUS, DIAGNOSTIC Mirena IUD placement on 25    **Virtual Visit    Past Medical History:   Diagnosis Date    Abnormal EKG     5/10/22--NORMAL SINUS RHYTHM  NONSPECIFIC T WAVE ABNORMALITY  ABNORMAL ECG    Anxiety     Arthritis     Cholelithiasis     Depression     grief to recent lost of mother    Dizziness 2024    Dysfunctional uterine bleeding     Endometrial cancer (Multi)     Seen by Dr. Ksenia Marrufo 25,On Mega2024    GERD (gastroesophageal reflux disease)     Obesity     Renal arterial aneurysm (CMS-HCC)     Left renal artery aneurysm, 1.4 cm- and 2.9 cm right renal artery aneurysm    Splenic artery aneurysm (CMS-HCC)     10/2024--Splenic artery aneurysm, 1.5 cm    Ventral hernia     10/2024 CT CAP--Moderate-sized ventral hernia       Past Surgical History:   Procedure Laterality Date    GALLBLADDER SURGERY      HERNIA REPAIR      inguinal       Patient  reports that she is not currently sexually active. She reports using the following method of birth control/protection: Post-menopausal.    Family History   Problem Relation Name Age of Onset    COPD Mother      Stomach cancer Mother      Other (ESRD) Mother      Breast cancer Mother  46    Hypertension Mother      Thyroid disease Father      Heart failure Father      Other (Bladder Cancer) Sister  52    Diabetes type II Daughter      Breast  cancer Maternal Grandmother         Allergies   Allergen Reactions    Iodine Itching    Penicillins Itching    Sulfa (Sulfonamide Antibiotics) Other       Prior to Admission medications    Medication Sig Start Date End Date Taking? Authorizing Provider   albuterol 90 mcg/actuation inhaler Inhale 2 puffs every 4 hours if needed.  Patient not taking: Reported on 1/9/2025 4/24/23   Historical Provider, MD   cyclobenzaprine (Flexeril) 10 mg tablet TAKE 1 TABLET (10 MG) BY MOUTH ONCE DAILY AS NEEDED FOR MUSCLE SPASMS. CAUTION, MAY CAUSE DROWSINESS. 1/6/25 3/7/25  MITRA Khoury   lidocaine (Lidoderm) 5 % patch Place 1 patch over 12 hours on the skin once daily. Apply to painful area 12 hours per day, remove for 12 hours. 1/10/25 2/9/25  MITRA Khoury   megestrol (Megace) 40 mg tablet Take 2 tablets (80 mg total) by mouth 2 times a day. 10/24/24 10/24/25  Olga Bergeron MD        PAT ROS:   Constitutional:   neg    Neuro/Psych:   neg    Eyes:   neg    Ears:   neg    Nose:   neg    Mouth:   neg    Throat:   neg    Neck:   neg    Cardio:   neg    Respiratory:   neg    Endocrine:   neg    GI:   neg    :   neg    Musculoskeletal:   neg    Hematologic:   neg    Skin:  neg        Physical Exam  Constitutional:       Appearance: Normal appearance.   HENT:      Head: Normocephalic.   Musculoskeletal:      Cervical back: Normal range of motion.   Neurological:      Mental Status: She is alert.          PAT AIRWAY:   Airway:     Mallampati::  Unable to assess           Visit Vitals  OB Status Postmenopausal   Smoking Status Never       DASI Risk Score      Flowsheet Row Clinical Support from 1/31/2025 in [facility]   Can you take care of yourself (eat, dress, bathe, or use toilet)?  2.75 filed at 01/31/2025 1444   Can you walk indoors, such as around your house? 1.75 filed at 01/31/2025 1444   Can you walk a block or two on level ground?  2.75 filed at 01/31/2025 1444   Can you climb a  flight of stairs or walk up a hill? 5.5 filed at 01/31/2025 1444   Can you run a short distance? 8 filed at 01/31/2025 1444   Can you do light work around the house like dusting or washing dishes? 2.7 filed at 01/31/2025 1444   Can you do moderate work around the house like vacuuming, sweeping floors or carrying groceries? 3.5 filed at 01/31/2025 1444   Can you do heavy work around the house like scrubbing floors or lifting and moving heavy furniture?  8 filed at 01/31/2025 1444   Can you do yard work like raking leaves, weeding or pushing a mower? 4.5 filed at 01/31/2025 1444   Can you have sexual relations? 5.25 filed at 01/31/2025 1444   Can you participate in moderate recreational activities like golf, bowling, dancing, doubles tennis or throwing a baseball or football? 6 filed at 01/31/2025 1444   Can you participate in strenous sports like swimming, singles tennis, football, basketball, or skiing? 7.5 filed at 01/31/2025 1444   DASI SCORE 58.2 filed at 01/31/2025 1444   METS Score (Will be calculated only when all the questions are answered) 9.9 filed at 01/31/2025 1444          Caprini DVT Assessment      Flowsheet Row Telemedicine Clinical Support from 2/6/2025 in [facility]   DVT Score (IF A SCORE IS NOT CALCULATING, MUST SELECT A BMI TO COMPLETE) 8 filed at 02/06/2025 0927   Medical Factors Present cancer, chemotherapy, or previous malignancy filed at 02/06/2025 0927   Surgical Factors Minor surgery planned filed at 02/06/2025 0927   BMI (BMI MUST BE CHOSEN) Greater than 50 (Venous stasis syndrome) filed at 02/06/2025 0927          Modified Frailty Index      Flowsheet Row Telemedicine Clinical Support from 2/6/2025 in [facility]   Non-independent functional status (problems with dressing, bathing, personal grooming, or cooking) 0 filed at 02/06/2025 0927   History of diabetes mellitus  0 filed at 02/06/2025 0927   History of COPD 0 filed at 02/06/2025 0927   History of  CHF No filed at 02/06/2025 0927   History of MI 0 filed at 02/06/2025 0927   History of Percutaneous Coronary Intervention, Cardiac Surgery, or Angina No filed at 02/06/2025 0927   Hypertension requiring the use of medication  0 filed at 02/06/2025 0927   Peripheral vascular disease 0 filed at 02/06/2025 0927   Impaired sensorium (cognitive impairement or loss, clouding, or delirium) 0 filed at 02/06/2025 0927   TIA or CVA withouy residual deficit 0 filed at 02/06/2025 0927   Cerebrovascular accident with deficit 0 filed at 02/06/2025 0927   Modified Frailty Index Calculator 0 filed at 02/06/2025 0927          CHADS2 Stroke Risk  Current as of yesterday        N/A 3 to 100%: High Risk   2 to < 3%: Medium Risk   0 to < 2%: Low Risk     Last Change: N/A          This score determines the patient's risk of having a stroke if the patient has atrial fibrillation.        This score is not applicable to this patient. Components are not calculated.          Revised Cardiac Risk Index      Flowsheet Row Telemedicine Clinical Support from 2/6/2025 in [facility]   High-Risk Surgery (Intraperitoneal, Intrathoracic,Suprainguinal vascular) 1 filed at 02/06/2025 0927   History of ischemic heart disease (History of MI, History of positive exercuse test, Current chest paint considered due to myocardial ischemia, Use of nitrate therapy, ECG with pathological Q Waves) 0 filed at 02/06/2025 0927   History of congestive heart failure (pulmonary edemia, bilateral rales or S3 gallop, Paroxysmal nocturnal dyspnea, CXR showing pulmonary vascular redistribution) 0 filed at 02/06/2025 0927   History of cerebrovascular disease (Prior TIA or stroke) 0 filed at 02/06/2025 0927   Pre-operative insulin treatment 0 filed at 02/06/2025 0927   Pre-operative creatinine>2 mg/dl 0 filed at 02/06/2025 0927   Revised Cardiac Risk Calculator 1 filed at 02/06/2025 0927          Apfel Simplified Score      Flowsheet Row Telemedicine Clinical  Support from 2025 in [facility]   Smoking status 1 filed at 2025   History of motion sickness or PONV  0 filed at 2025   Use of postoperative opioids 1 filed at 2025   Gender - Female 1=Yes filed at 2025   Apfel Simplified Score Calculator 3 filed at 2025          Risk Analysis Index Results This Encounter    No data found in the last 10 encounters.       Prodigy: High Risk  Total Score: 8              Prodigy Age Score           ARISCAT Score for Postoperative Pulmonary Complications    No data to display       Pan Perioperative Risk for Myocardial Infarction or Cardiac Arrest (RYLEE)    No data to display     No results found for this or any previous visit (from the past 12 weeks).     Testing/Diagnostic:   - EK/10/22, see Care Everywhere  NORMAL SINUS RHYTHM   NONSPECIFIC T WAVE ABNORMALITY   ABNORMAL ECG     - MR Pelvis: 10/14/24  IMPRESSION:  1.  Endometrial mass with invasion into the myometrium, FIGO stage I.  2. No extra uterine spread of disease.  3. No lymphadenopathy.  4. Multiple uterine fibroids. Exophytic mass along the posterior  aspect of the cervix is distinct from the endometrial mass and  suggestive of a primary cervical lesion such as fibroid.    - CT Chest A/P: 10/14/24  IMPRESSION:  1. Re-demonstration of peripherally calcified saccular aneurysms of the bilateral renal arteries and distal splenic artery.  2. Moderate-sized ventral hernia containing intra-abdominal fat and a nondilated loop of transverse colon.  3. Prominence of the endometrial cavity however evaluation is limited secondary to streak artifact, correlate with same day dedicated MR pelvis.  4. Cholelithiasis without evidence of cholecystitis.       - CT Head: 24  IMPRESSION:  No depressed skull fracture. No acute intracranial bleed or focal  mass effect.      Findings of acute left maxillary sinusitis and chronic left sphenoid  sinusitis.       Mild-to-moderate nonspecific patchy white matter hypodensities as described. Chronic white matter ischemic disease versus nonspecific demyelinating disorder.      Assessment and Plan:     Anesthesia:  The patient denies problems with anesthesia in the past such as PONV, prolonged sedation, awareness, dental damage, aspiration, cardiac arrest, difficult intubation, or unexpected hospital admissions.     Neuro:   The patient has no neurological diagnoses or significant findings on chart review, clinical presentation, and evaluation. No grossly apparent neurological perioperative risk. The patient is at increased risk for postoperative delirium secondary to age 65 or older. The patient is at increased risk for perioperative stroke secondary to increased age, female gender. Handouts for preoperative brain exercises given to patient.    HEENT/Airway  No diagnoses, significant findings on chart review, clinical presentation, or evaluation. The patient is at increased risk of airway difficulty secondary to obesity, short thick neck. No documented or reported history of airway difficulty.     Cardiovascular  The patient is scheduled for non-cardiac surgery associated with elevated risk. The patient has no major cardiac contraindications to non- cardiac surgery.  RCRI  The patient meets 1 RCRI criteria and therefore has a 6% risk of major adverse cardiac complications.  METS  The patient's functional capacity is greater than 4 METS.  EKG  The patient has no EKG or echocardiographic changes concerning for myocardial ischemia.   Heart Failure  The patient has no known history of heart failure.  Additionally, the patient reports no symptoms of heart failure and demonstrates no signs of heart failure.  Hypertension Evaluation  The patient has no known history of hypertension and has a normal blood pressure today.  Heart Rhythm Evaluation  The patient has no history of arrhythmias.  Heart Valve Evaluation  The patient has no  known history of valvular heart disease. The patient has no symptoms or physical exam findings to suggest valvular heart disease.  Cardiology Evaluation  The patient is not followed by cardiology.  Vascular Evaluation      Vascular: Tereza Dunphy 10/15/24 evaluation of visceral artery aneurysms. She states that she is known about these from previous CT scans that she has had.    CTA performed 10/2024.  She does still have a 1.8 cm splenic artery aneurysm.  She has a 2.9 cm right renal artery aneurysm and a 1.4 cm left renal artery aneurysm.     Per note:  #Left renal artery aneurysm, 1.4 cm  - This aneurysm has grown since her previous CT scan in 2022.  However it does not meet criteria for repair.  Will continue to monitor with surveillance.     #Splenic artery aneurysm, 1.5 cm  - This aneurysm does not meet criteria for repair.  We will continue to monitor with surveillance.    RYLEE score which indicates a 0.2% risk of intraoperative or 30-day postoperative MACE (major adverse cardiac event).    Pulmonary   No significant findings on chart review or clinical presentation and evaluation. The patient is at increased risk of perioperative pulmonary complications secondary to advanced age greater than 60.    PRODIGY 11, intermediate risk of respiratory depression episode. Patient given PI sheet for preoperative deep breathing exercises.    Hematology  No diagnoses or significant findings on chart review or clinical presentation and evaluation.    Caprini score 8, high risk of perioperative VTE.     Patient instructed to ambulate as soon as possible postoperatively to decrease thromboembolic risk. Initiate mechanical DVT prophylaxis as soon as possible and initiate chemical prophylaxis when deemed safe from a bleeding standpoint post surgery.     Transfusion Evaluation  A type and screen was obtained given the likelihood for perioperative transfusion of blood or blood products.    Gastrointestinal  No diagnoses or  significant findings on chart review or clinical presentation and evaluation.    Genitourinary  The patient has recently diagnosed endometrial cancer. Scheduled for D&C, mirena placement on 2/18/25 with Dr. Marrufo.     Gyn Onc: Ksenia Yaron 01/09/25 follow up with endometrial cancer (clinical stage IA G1)per biopsy on 9/6/24 currently on megace 160 BID. reports she is still bleeding though it has improved. She is consider IUD placement but would strongly prefer this to be under anesthesia.     Pelvic MRI 10/14/24  IMPRESSION:  1.  Endometrial mass with invasion into the myometrium, FIGO stage I.  2. No extra uterine spread of disease.  3. No lymphadenopathy.  4. Multiple uterine fibroids. Exophytic mass along the posterior  aspect of the cervix is distinct from the endometrial mass and  suggestive of a primary cervical lesion such as fibroid.      Renal  No renal diagnoses or significant findings on chart review or clinical presentation and evaluation. The patient has specific risk factors associated with increased risk of perioperative renal complications related to age greater than 55. Preventative measures include preoperative hydration.    Musculoskeletal  No diagnoses or significant findings on chart review or clinical presentation and evaluation.    Endocrine  No diagnoses or significant findings on chart review or clinical presentation and evaluation.    ID  No diagnoses or significant findings on chart review or clinical presentation and evaluation.    -Preoperative medication instructions were provided and reviewed with the patient.  Any additional testing or evaluation was explained to the patient.  NPO Instructions were discussed, and the patient's questions were answered prior to conclusion of this encounter. Patient verbalized understanding of preoperative instructions. After Visit Summary given.      Magestrol   Carbeme

## 2025-02-13 ENCOUNTER — LAB (OUTPATIENT)
Dept: LAB | Facility: HOSPITAL | Age: 70
End: 2025-02-13
Payer: MEDICARE

## 2025-02-13 DIAGNOSIS — C54.1 MALIGNANT NEOPLASM OF ENDOMETRIUM (MULTI): Primary | ICD-10-CM

## 2025-02-13 DIAGNOSIS — C54.1 ENDOMETRIAL CANCER (MULTI): ICD-10-CM

## 2025-02-13 LAB
ABO GROUP (TYPE) IN BLOOD: NORMAL
ANTIBODY SCREEN: NORMAL
ERYTHROCYTE [DISTWIDTH] IN BLOOD BY AUTOMATED COUNT: 15 % (ref 11.5–14.5)
HCT VFR BLD AUTO: 38.8 % (ref 36–46)
HGB BLD-MCNC: 12.5 G/DL (ref 12–16)
MCH RBC QN AUTO: 31.5 PG (ref 26–34)
MCHC RBC AUTO-ENTMCNC: 32.2 G/DL (ref 32–36)
MCV RBC AUTO: 98 FL (ref 80–100)
NRBC BLD-RTO: 0 /100 WBCS (ref 0–0)
PLATELET # BLD AUTO: 296 X10*3/UL (ref 150–450)
RBC # BLD AUTO: 3.97 X10*6/UL (ref 4–5.2)
RH FACTOR (ANTIGEN D): NORMAL
WBC # BLD AUTO: 7.1 X10*3/UL (ref 4.4–11.3)

## 2025-02-13 PROCEDURE — 36415 COLL VENOUS BLD VENIPUNCTURE: CPT

## 2025-02-13 PROCEDURE — 86900 BLOOD TYPING SEROLOGIC ABO: CPT

## 2025-02-13 PROCEDURE — 86901 BLOOD TYPING SEROLOGIC RH(D): CPT

## 2025-02-13 PROCEDURE — 86850 RBC ANTIBODY SCREEN: CPT

## 2025-02-13 PROCEDURE — 85027 COMPLETE CBC AUTOMATED: CPT

## 2025-02-13 NOTE — HOSPITAL COURSE
[x] PAT  [ ] Plan for overnight obs? no  [x] Consent  [x] Preop meds  [x] Add to list    Gynecologic Oncology Surgery H&P  Flaquita Watson is a 69 y.o. female with history of endometrial cancer presenting for D&C and IUD placement.     Preop labs (): Hgb 12.5, Plt 296    PAT (): cleared    Tumor History:  Imaging/pathology  Oncology History Overview Note   24 EMBx Endometrial adenocarcinoma, endometrioid type, FIGO grade 1 p53wt MMRd MLH1 hypermethylation     Endometrial cancer (Multi)   2024 Initial Diagnosis    Endometrial cancer (Multi)       Pelvic MRI 10/14/24  IMPRESSION:  1.  Endometrial mass with invasion into the myometrium, FIGO stage I.  2. No extra uterine spread of disease.  3. No lymphadenopathy.  4. Multiple uterine fibroids. Exophytic mass along the posterior  aspect of the cervix is distinct from the endometrial mass and  suggestive of a primary cervical lesion such as fibroid.    Past Medical History:  Past Medical History:   Diagnosis Date    Abnormal EKG     5/10/22--NORMAL SINUS RHYTHM  NONSPECIFIC T WAVE ABNORMALITY  ABNORMAL ECG    Anxiety     Arthritis     Cholelithiasis     Depression     grief to recent lost of mother    Dizziness 2024    Dysfunctional uterine bleeding     Endometrial cancer (Multi)     Seen by Dr. Ksenia Marrufo 25,On Megace    Fall 2024    GERD (gastroesophageal reflux disease)     Obesity     Renal arterial aneurysm (CMS-HCC)     Left renal artery aneurysm, 1.4 cm- and 2.9 cm right renal artery aneurysm    Splenic artery aneurysm (CMS-HCC)     10/2024--Splenic artery aneurysm, 1.5 cm    Ventral hernia     10/2024 CT CAP--Moderate-sized ventral hernia        Surgical History:  Past Surgical History:   Procedure Laterality Date    GALLBLADDER SURGERY      HERNIA REPAIR      inguinal        Ob/Gyn History:  OB History    Para Term  AB Living   2 2 2         SAB IAB Ectopic Multiple Live Births                  # Outcome Date GA Lbr  Efrain/2nd Weight Sex Type Anes PTL Lv   2 Term            1 Term                 Social History:  Social History     Socioeconomic History    Marital status: Single   Tobacco Use    Smoking status: Never    Smokeless tobacco: Never   Vaping Use    Vaping status: Never Used   Substance and Sexual Activity    Alcohol use: Not Currently    Drug use: Never    Sexual activity: Not Currently     Birth control/protection: Post-menopausal     Social Drivers of Health     Financial Resource Strain: Low Risk  (5/25/2022)    Received from [facility]    Overall Financial Resource Strain (CARDIA)     Difficulty of Paying Living Expenses: Not hard at all   Food Insecurity: Unknown (4/24/2023)    Received from [facility]    Hunger Vital Sign     Worried About Running Out of Food in the Last Year: Never true   Transportation Needs: No Transportation Needs (5/25/2022)    Received from [facility]    PRAPARE - Transportation     Lack of Transportation (Medical): No     Lack of Transportation (Non-Medical): No   Housing Stability: Unknown (5/25/2022)    Received from [facility]    Housing Stability Vital Sign     Unable to Pay for Housing in the Last Year: No     Unstable Housing in the Last Year: No        Family History:  Family History   Problem Relation Name Age of Onset    COPD Mother      Stomach cancer Mother      Other (ESRD) Mother      Breast cancer Mother  46    Hypertension Mother      Thyroid disease Father      Heart failure Father      Other (Bladder Cancer) Sister  52    Diabetes type II Daughter      Breast cancer Maternal Grandmother          Medications:  Current Outpatient Medications   Medication Instructions    albuterol 90 mcg/actuation inhaler 2 puffs, Every 4 hours PRN    cyclobenzaprine (FLEXERIL) 10 mg, oral, Daily PRN, Caution, may cause drowsiness.    megestrol (MEGACE) 80 mg, oral, 2 times daily    naproxen sodium  (ALEVE) 220 mg, 2 times daily (morning and late afternoon)       Allergies:  Iodine, Penicillins, and Sulfa (sulfonamide antibiotics)    Objective    Last Vitals  There were no vitals taken for this visit.    Physical Examination  General: no acute distress  HEENT: normocephalic, atraumatic  Heart: warm and well perfused  Lungs: breathing comfortably on room air  Abdomen: to be performed in OR  Extremities: moving all extremities  Neuro: awake and conversant  Psych: appropriate mood and affect    Lab Review  Results from last 7 days   Lab Units 02/13/25  0840   HEMOGLOBIN g/dL 12.5   HEMATOCRIT % 38.8   PLATELETS AUTO x10*3/uL 296         Lab Results   Component Value Date    WBC 7.1 02/13/2025    HGB 12.5 02/13/2025    HCT 38.8 02/13/2025    MCV 98 02/13/2025     02/13/2025       Lab Results   Component Value Date    GLUCOSE 92 10/08/2024    CALCIUM 9.0 10/08/2024     10/08/2024    K 4.2 10/08/2024    CO2 25 10/08/2024     10/08/2024    BUN 13 10/08/2024    CREATININE 0.81 10/08/2024       Assessment/Plan     Flaquita Watson is a 69 y.o. with FIGO stage 1 endometrial cancer on Megace presenting for scheduled surgery.    Plan to proceed with D&C and IUD placement.  Surgical consent was reviewed.     To be d/w Dr. Marrufo    Gyn Onc Pager [phone]

## 2025-02-18 ENCOUNTER — ANESTHESIA (OUTPATIENT)
Dept: OPERATING ROOM | Facility: HOSPITAL | Age: 70
End: 2025-02-18
Payer: MEDICARE

## 2025-02-18 ENCOUNTER — ANESTHESIA EVENT (OUTPATIENT)
Dept: OPERATING ROOM | Facility: HOSPITAL | Age: 70
End: 2025-02-18
Payer: MEDICARE

## 2025-02-18 ENCOUNTER — HOSPITAL ENCOUNTER (OUTPATIENT)
Facility: HOSPITAL | Age: 70
Setting detail: OUTPATIENT SURGERY
Discharge: HOME | End: 2025-02-18
Attending: STUDENT IN AN ORGANIZED HEALTH CARE EDUCATION/TRAINING PROGRAM | Admitting: STUDENT IN AN ORGANIZED HEALTH CARE EDUCATION/TRAINING PROGRAM
Payer: MEDICARE

## 2025-02-18 VITALS
HEIGHT: 63 IN | DIASTOLIC BLOOD PRESSURE: 80 MMHG | BODY MASS INDEX: 51.91 KG/M2 | RESPIRATION RATE: 16 BRPM | TEMPERATURE: 97.5 F | WEIGHT: 293 LBS | SYSTOLIC BLOOD PRESSURE: 166 MMHG | OXYGEN SATURATION: 100 % | HEART RATE: 78 BPM

## 2025-02-18 DIAGNOSIS — Z98.890 POST-OPERATIVE STATE: Primary | ICD-10-CM

## 2025-02-18 DIAGNOSIS — C54.1 ENDOMETRIAL CANCER (MULTI): ICD-10-CM

## 2025-02-18 PROCEDURE — 3700000001 HC GENERAL ANESTHESIA TIME - INITIAL BASE CHARGE: Performed by: STUDENT IN AN ORGANIZED HEALTH CARE EDUCATION/TRAINING PROGRAM

## 2025-02-18 PROCEDURE — 2500000004 HC RX 250 GENERAL PHARMACY W/ HCPCS (ALT 636 FOR OP/ED)

## 2025-02-18 PROCEDURE — 3600000005 HC OR TIME - INITIAL BASE CHARGE - PROCEDURE LEVEL FIVE: Performed by: STUDENT IN AN ORGANIZED HEALTH CARE EDUCATION/TRAINING PROGRAM

## 2025-02-18 PROCEDURE — 2500000005 HC RX 250 GENERAL PHARMACY W/O HCPCS: Performed by: ANESTHESIOLOGY

## 2025-02-18 PROCEDURE — 7100000009 HC PHASE TWO TIME - INITIAL BASE CHARGE: Performed by: STUDENT IN AN ORGANIZED HEALTH CARE EDUCATION/TRAINING PROGRAM

## 2025-02-18 PROCEDURE — 0753T DGTZ GLS MCRSCP SLD LEVEL IV: CPT | Mod: TC,SUR | Performed by: STUDENT IN AN ORGANIZED HEALTH CARE EDUCATION/TRAINING PROGRAM

## 2025-02-18 PROCEDURE — 6360000003 HC OR 636 NO HCPCS: Mod: MUE | Performed by: STUDENT IN AN ORGANIZED HEALTH CARE EDUCATION/TRAINING PROGRAM

## 2025-02-18 PROCEDURE — 2500000001 HC RX 250 WO HCPCS SELF ADMINISTERED DRUGS (ALT 637 FOR MEDICARE OP): Performed by: STUDENT IN AN ORGANIZED HEALTH CARE EDUCATION/TRAINING PROGRAM

## 2025-02-18 PROCEDURE — 58120 DILATION AND CURETTAGE: CPT | Performed by: STUDENT IN AN ORGANIZED HEALTH CARE EDUCATION/TRAINING PROGRAM

## 2025-02-18 PROCEDURE — 7100000010 HC PHASE TWO TIME - EACH INCREMENTAL 1 MINUTE: Performed by: STUDENT IN AN ORGANIZED HEALTH CARE EDUCATION/TRAINING PROGRAM

## 2025-02-18 PROCEDURE — 7100000002 HC RECOVERY ROOM TIME - EACH INCREMENTAL 1 MINUTE: Performed by: STUDENT IN AN ORGANIZED HEALTH CARE EDUCATION/TRAINING PROGRAM

## 2025-02-18 PROCEDURE — 3700000002 HC GENERAL ANESTHESIA TIME - EACH INCREMENTAL 1 MINUTE: Performed by: STUDENT IN AN ORGANIZED HEALTH CARE EDUCATION/TRAINING PROGRAM

## 2025-02-18 PROCEDURE — A58120 PR DILATION/CURETTAGE,DIAGNOSTIC: Performed by: ANESTHESIOLOGY

## 2025-02-18 PROCEDURE — 2500000005 HC RX 250 GENERAL PHARMACY W/O HCPCS: Performed by: STUDENT IN AN ORGANIZED HEALTH CARE EDUCATION/TRAINING PROGRAM

## 2025-02-18 PROCEDURE — A58120 PR DILATION/CURETTAGE,DIAGNOSTIC: Performed by: NURSE ANESTHETIST, CERTIFIED REGISTERED

## 2025-02-18 PROCEDURE — 58300 INSERT INTRAUTERINE DEVICE: CPT | Performed by: STUDENT IN AN ORGANIZED HEALTH CARE EDUCATION/TRAINING PROGRAM

## 2025-02-18 PROCEDURE — 7100000001 HC RECOVERY ROOM TIME - INITIAL BASE CHARGE: Performed by: STUDENT IN AN ORGANIZED HEALTH CARE EDUCATION/TRAINING PROGRAM

## 2025-02-18 PROCEDURE — 2500000004 HC RX 250 GENERAL PHARMACY W/ HCPCS (ALT 636 FOR OP/ED): Performed by: STUDENT IN AN ORGANIZED HEALTH CARE EDUCATION/TRAINING PROGRAM

## 2025-02-18 PROCEDURE — 3600000010 HC OR TIME - EACH INCREMENTAL 1 MINUTE - PROCEDURE LEVEL FIVE: Performed by: STUDENT IN AN ORGANIZED HEALTH CARE EDUCATION/TRAINING PROGRAM

## 2025-02-18 DEVICE — IMPLANTABLE DEVICE: Type: IMPLANTABLE DEVICE | Site: UTERUS | Status: FUNCTIONAL

## 2025-02-18 RX ORDER — SILVER NITRATE 38.21; 12.74 MG/1; MG/1
STICK TOPICAL AS NEEDED
Status: DISCONTINUED | OUTPATIENT
Start: 2025-02-18 | End: 2025-02-18 | Stop reason: HOSPADM

## 2025-02-18 RX ORDER — OXYCODONE HYDROCHLORIDE 5 MG/1
5 TABLET ORAL EVERY 4 HOURS PRN
Status: DISCONTINUED | OUTPATIENT
Start: 2025-02-18 | End: 2025-02-18 | Stop reason: HOSPADM

## 2025-02-18 RX ORDER — SODIUM CHLORIDE, SODIUM LACTATE, POTASSIUM CHLORIDE, CALCIUM CHLORIDE 600; 310; 30; 20 MG/100ML; MG/100ML; MG/100ML; MG/100ML
100 INJECTION, SOLUTION INTRAVENOUS CONTINUOUS
Status: DISCONTINUED | OUTPATIENT
Start: 2025-02-18 | End: 2025-02-18 | Stop reason: HOSPADM

## 2025-02-18 RX ORDER — METOPROLOL TARTRATE 1 MG/ML
INJECTION, SOLUTION INTRAVENOUS AS NEEDED
Status: DISCONTINUED | OUTPATIENT
Start: 2025-02-18 | End: 2025-02-18

## 2025-02-18 RX ORDER — ESMOLOL HYDROCHLORIDE 10 MG/ML
INJECTION INTRAVENOUS AS NEEDED
Status: DISCONTINUED | OUTPATIENT
Start: 2025-02-18 | End: 2025-02-18

## 2025-02-18 RX ORDER — ACETAMINOPHEN 500 MG
1000 TABLET ORAL EVERY 6 HOURS PRN
Qty: 30 TABLET | Refills: 0 | Status: SHIPPED | OUTPATIENT
Start: 2025-02-18

## 2025-02-18 RX ORDER — HYDROMORPHONE HYDROCHLORIDE 0.2 MG/ML
0.2 INJECTION INTRAMUSCULAR; INTRAVENOUS; SUBCUTANEOUS EVERY 5 MIN PRN
Status: DISCONTINUED | OUTPATIENT
Start: 2025-02-18 | End: 2025-02-18 | Stop reason: HOSPADM

## 2025-02-18 RX ORDER — ONDANSETRON HYDROCHLORIDE 2 MG/ML
4 INJECTION, SOLUTION INTRAVENOUS ONCE AS NEEDED
Status: DISCONTINUED | OUTPATIENT
Start: 2025-02-18 | End: 2025-02-18 | Stop reason: HOSPADM

## 2025-02-18 RX ORDER — LIDOCAINE HCL/PF 100 MG/5ML
SYRINGE (ML) INTRAVENOUS AS NEEDED
Status: DISCONTINUED | OUTPATIENT
Start: 2025-02-18 | End: 2025-02-18

## 2025-02-18 RX ORDER — ROCURONIUM BROMIDE 10 MG/ML
INJECTION, SOLUTION INTRAVENOUS AS NEEDED
Status: DISCONTINUED | OUTPATIENT
Start: 2025-02-18 | End: 2025-02-18

## 2025-02-18 RX ORDER — SODIUM CHLORIDE, SODIUM LACTATE, POTASSIUM CHLORIDE, CALCIUM CHLORIDE 600; 310; 30; 20 MG/100ML; MG/100ML; MG/100ML; MG/100ML
INJECTION, SOLUTION INTRAVENOUS CONTINUOUS PRN
Status: DISCONTINUED | OUTPATIENT
Start: 2025-02-18 | End: 2025-02-18

## 2025-02-18 RX ORDER — TRAMADOL HYDROCHLORIDE 50 MG/1
50 TABLET ORAL EVERY 8 HOURS PRN
Qty: 5 TABLET | Refills: 0 | Status: SHIPPED | OUTPATIENT
Start: 2025-02-18

## 2025-02-18 RX ORDER — FENTANYL CITRATE 50 UG/ML
INJECTION, SOLUTION INTRAMUSCULAR; INTRAVENOUS AS NEEDED
Status: DISCONTINUED | OUTPATIENT
Start: 2025-02-18 | End: 2025-02-18

## 2025-02-18 RX ORDER — LIDOCAINE HYDROCHLORIDE 10 MG/ML
0.1 INJECTION, SOLUTION EPIDURAL; INFILTRATION; INTRACAUDAL; PERINEURAL ONCE
Status: DISCONTINUED | OUTPATIENT
Start: 2025-02-18 | End: 2025-02-18 | Stop reason: HOSPADM

## 2025-02-18 RX ORDER — ONDANSETRON HYDROCHLORIDE 2 MG/ML
INJECTION, SOLUTION INTRAVENOUS AS NEEDED
Status: DISCONTINUED | OUTPATIENT
Start: 2025-02-18 | End: 2025-02-18

## 2025-02-18 RX ORDER — ACETAMINOPHEN 325 MG/1
975 TABLET ORAL ONCE
Status: COMPLETED | OUTPATIENT
Start: 2025-02-18 | End: 2025-02-18

## 2025-02-18 RX ORDER — PROPOFOL 10 MG/ML
INJECTION, EMULSION INTRAVENOUS AS NEEDED
Status: DISCONTINUED | OUTPATIENT
Start: 2025-02-18 | End: 2025-02-18

## 2025-02-18 RX ORDER — PHENYLEPHRINE HCL IN 0.9% NACL 0.4MG/10ML
SYRINGE (ML) INTRAVENOUS AS NEEDED
Status: DISCONTINUED | OUTPATIENT
Start: 2025-02-18 | End: 2025-02-18

## 2025-02-18 RX ADMIN — METOPROLOL TARTRATE 4 MG: 1 INJECTION, SOLUTION INTRAVENOUS at 12:31

## 2025-02-18 RX ADMIN — METOPROLOL TARTRATE 1 MG: 1 INJECTION, SOLUTION INTRAVENOUS at 12:49

## 2025-02-18 RX ADMIN — Medication 6 L/MIN: at 13:05

## 2025-02-18 RX ADMIN — ROCURONIUM BROMIDE 60 MG: 10 INJECTION INTRAVENOUS at 12:26

## 2025-02-18 RX ADMIN — ESMOLOL HYDROCHLORIDE 20 MG: 100 INJECTION, SOLUTION INTRAVENOUS at 12:31

## 2025-02-18 RX ADMIN — PROPOFOL 200 MG: 10 INJECTION, EMULSION INTRAVENOUS at 12:26

## 2025-02-18 RX ADMIN — ONDANSETRON 4 MG: 2 INJECTION INTRAMUSCULAR; INTRAVENOUS at 12:49

## 2025-02-18 RX ADMIN — DEXAMETHASONE SODIUM PHOSPHATE 8 MG: 4 INJECTION INTRA-ARTICULAR; INTRALESIONAL; INTRAMUSCULAR; INTRAVENOUS; SOFT TISSUE at 12:40

## 2025-02-18 RX ADMIN — ACETAMINOPHEN 975 MG: 325 TABLET ORAL at 10:54

## 2025-02-18 RX ADMIN — Medication 80 MCG: at 12:41

## 2025-02-18 RX ADMIN — SUGAMMADEX 400 MG: 100 INJECTION, SOLUTION INTRAVENOUS at 12:58

## 2025-02-18 RX ADMIN — ESMOLOL HYDROCHLORIDE 40 MG: 100 INJECTION, SOLUTION INTRAVENOUS at 13:03

## 2025-02-18 RX ADMIN — ESMOLOL HYDROCHLORIDE 40 MG: 100 INJECTION, SOLUTION INTRAVENOUS at 12:59

## 2025-02-18 RX ADMIN — SUGAMMADEX 200 MG: 100 INJECTION, SOLUTION INTRAVENOUS at 12:59

## 2025-02-18 RX ADMIN — LIDOCAINE HYDROCHLORIDE 80 MG: 20 INJECTION INTRAVENOUS at 12:26

## 2025-02-18 RX ADMIN — SODIUM CHLORIDE, POTASSIUM CHLORIDE, SODIUM LACTATE AND CALCIUM CHLORIDE: 600; 310; 30; 20 INJECTION, SOLUTION INTRAVENOUS at 12:16

## 2025-02-18 RX ADMIN — FENTANYL CITRATE 100 MCG: 50 INJECTION, SOLUTION INTRAMUSCULAR; INTRAVENOUS at 12:26

## 2025-02-18 SDOH — HEALTH STABILITY: MENTAL HEALTH: CURRENT SMOKER: 0

## 2025-02-18 ASSESSMENT — PAIN SCALES - GENERAL
PAINLEVEL_OUTOF10: 0 - NO PAIN
PAINLEVEL_OUTOF10: 0 - NO PAIN
PAIN_LEVEL: 0
PAINLEVEL_OUTOF10: 0 - NO PAIN

## 2025-02-18 ASSESSMENT — COLUMBIA-SUICIDE SEVERITY RATING SCALE - C-SSRS
1. IN THE PAST MONTH, HAVE YOU WISHED YOU WERE DEAD OR WISHED YOU COULD GO TO SLEEP AND NOT WAKE UP?: NO
6. HAVE YOU EVER DONE ANYTHING, STARTED TO DO ANYTHING, OR PREPARED TO DO ANYTHING TO END YOUR LIFE?: NO
2. HAVE YOU ACTUALLY HAD ANY THOUGHTS OF KILLING YOURSELF?: NO

## 2025-02-18 ASSESSMENT — PAIN - FUNCTIONAL ASSESSMENT
PAIN_FUNCTIONAL_ASSESSMENT: 0-10

## 2025-02-18 NOTE — ANESTHESIA PROCEDURE NOTES
Airway  Date/Time: 2/18/2025 12:28 PM  Urgency: elective    Airway not difficult    Staffing  Performed: [facility]   Authorized by: Boom William MD    Performed by: Ana Suresh  Patient location during procedure: OR    Indications and Patient Condition  Indications for airway management: anesthesia  Spontaneous Ventilation: absent  Sedation level: deep  Preoxygenated: yes  Patient position: ramp  Mask difficulty assessment: 2 - vent by mask + OA or adjuvant +/- NMBA  Planned trial extubation    Final Airway Details  Final airway type: endotracheal airway      Successful airway: ETT  Cuffed: yes   Successful intubation technique: direct laryngoscopy  Facilitating devices/methods: intubating stylet and cricoid pressure  Endotracheal tube insertion site: oral  Blade: [place]  Blade size: #3  ETT size (mm): 7.0  Cormack-Lehane Classification: grade I - full view of glottis  Placement verified by: capnometry and palpation of cuff   Measured from: lips  ETT to lips (cm): 21  Number of attempts at approach: 1

## 2025-02-18 NOTE — H&P
Gynecologic Oncology Surgery H&P    Flaquita Watson is a 69 y.o. female with history of endometrial cancer presenting for D&C and IUD placement.     Preop labs (): Hgb 12.5, Plt 296    PAT (): cleared    Tumor History:  Imaging/pathology  Oncology History Overview Note   24 EMBx Endometrial adenocarcinoma, endometrioid type, FIGO grade 1 p53wt MMRd MLH1 hypermethylation     Endometrial cancer (Multi)   2024 Initial Diagnosis    Endometrial cancer (Multi)       Pelvic MRI 10/14/24  IMPRESSION:  1.  Endometrial mass with invasion into the myometrium, FIGO stage I.  2. No extra uterine spread of disease.  3. No lymphadenopathy.  4. Multiple uterine fibroids. Exophytic mass along the posterior  aspect of the cervix is distinct from the endometrial mass and  suggestive of a primary cervical lesion such as fibroid.    Past Medical History:  Past Medical History:   Diagnosis Date    Abnormal EKG     5/10/22--NORMAL SINUS RHYTHM  NONSPECIFIC T WAVE ABNORMALITY  ABNORMAL ECG    Anxiety     Arthritis     Cholelithiasis     Depression     grief to recent lost of mother    Dizziness 2024    Dysfunctional uterine bleeding     Endometrial cancer (Multi)     Seen by Dr. Ksenia Marrufo 25,On Mega2024    GERD (gastroesophageal reflux disease)     Obesity     Renal arterial aneurysm (CMS-HCC)     Left renal artery aneurysm, 1.4 cm- and 2.9 cm right renal artery aneurysm    Splenic artery aneurysm (CMS-HCC)     10/2024--Splenic artery aneurysm, 1.5 cm    Ventral hernia     10/2024 CT CAP--Moderate-sized ventral hernia        Surgical History:  Past Surgical History:   Procedure Laterality Date    GALLBLADDER SURGERY      HERNIA REPAIR      inguinal        Ob/Gyn History:  OB History    Para Term  AB Living   2 2 2         SAB IAB Ectopic Multiple Live Births                  # Outcome Date GA Lbr Efrain/2nd Weight Sex Type Anes PTL Lv   2 Term            1 Term                 Social  History:  Social History     Socioeconomic History    Marital status: Single   Tobacco Use    Smoking status: Never    Smokeless tobacco: Never   Vaping Use    Vaping status: Never Used   Substance and Sexual Activity    Alcohol use: Not Currently    Drug use: Never    Sexual activity: Not Currently     Birth control/protection: Post-menopausal     Social Drivers of Health     Financial Resource Strain: Low Risk  (5/25/2022)    Received from [facility]    Overall Financial Resource Strain (CARDIA)     Difficulty of Paying Living Expenses: Not hard at all   Food Insecurity: Unknown (4/24/2023)    Received from [facility]    Hunger Vital Sign     Worried About Running Out of Food in the Last Year: Never true   Transportation Needs: No Transportation Needs (5/25/2022)    Received from [facility]    PRAPARE - Transportation     Lack of Transportation (Medical): No     Lack of Transportation (Non-Medical): No   Housing Stability: Unknown (5/25/2022)    Received from [facility]    Housing Stability Vital Sign     Unable to Pay for Housing in the Last Year: No     Unstable Housing in the Last Year: No        Family History:  Family History   Problem Relation Name Age of Onset    COPD Mother      Stomach cancer Mother      Other (ESRD) Mother      Breast cancer Mother  46    Hypertension Mother      Thyroid disease Father      Heart failure Father      Other (Bladder Cancer) Sister  52    Diabetes type II Daughter      Breast cancer Maternal Grandmother          Medications:  Current Outpatient Medications   Medication Instructions    albuterol 90 mcg/actuation inhaler 2 puffs, Every 4 hours PRN    cyclobenzaprine (FLEXERIL) 10 mg, oral, Daily PRN, Caution, may cause drowsiness.    megestrol (MEGACE) 80 mg, oral, 2 times daily    naproxen sodium (ALEVE) 220 mg, 2 times daily (morning and late afternoon)       Allergies:  Iodine,  Penicillins, and Sulfa (sulfonamide antibiotics)    Objective      Vitals in chart were reviewed.    Physical Examination  General: no acute distress  HEENT: normocephalic, atraumatic  Heart: warm and well perfused  Lungs: breathing comfortably on room air  Abdomen: to be performed in OR  Extremities: moving all extremities  Neuro: awake and conversant  Psych: appropriate mood and affect    Lab Review  Results from last 7 days   Lab Units 02/13/25  0840   HEMOGLOBIN g/dL 12.5   HEMATOCRIT % 38.8   PLATELETS AUTO x10*3/uL 296         Lab Results   Component Value Date    WBC 7.1 02/13/2025    HGB 12.5 02/13/2025    HCT 38.8 02/13/2025    MCV 98 02/13/2025     02/13/2025       Lab Results   Component Value Date    GLUCOSE 92 10/08/2024    CALCIUM 9.0 10/08/2024     10/08/2024    K 4.2 10/08/2024    CO2 25 10/08/2024     10/08/2024    BUN 13 10/08/2024    CREATININE 0.81 10/08/2024       Assessment/Plan     Flaquita Watson is a 69 y.o. with FIGO stage 1 endometrial cancer on Megace presenting for scheduled surgery.    Plan to proceed with D&C and IUD placement.  Surgical consent was reviewed.     To be d/w Dr. Yaron Riddle MD, PGY-1  Gyn Onc Pager [phone]

## 2025-02-18 NOTE — ANESTHESIA PREPROCEDURE EVALUATION
Patient: Flaquita Watson    Procedure Information       Date/Time: 02/18/25 1050    Procedure: DILATION AND CURETTAGE, UTERUS, DIAGNOSTIC Mirena IUD placement    Location: [facility] OR 03 / Virtual [facility] OR    Surgeons: Ksenia Marrufo MD MPH            Relevant Problems   Cardiac   (+) Mixed hyperlipidemia      Liver   (+) Acute cholecystitis due to biliary calculus   (+) Calculus of gallbladder with acute cholecystitis without obstruction      Endocrine   (+) BMI 60.0-69.9, adult (Multi)   (+) Class 3 severe obesity due to excess calories with serious comorbidity in adult      Musculoskeletal   (+) Osteoarthritis of spine with radiculopathy, lumbar region      GYN   (+) Abnormal uterine bleeding   (+) Endometrial cancer (Multi)       Clinical information reviewed:   Tobacco  Allergies  Meds   Med Hx  Surg Hx   Fam Hx  Soc Hx        NPO Detail:  NPO/Void Status  Date of Last Liquid: 02/17/25  Time of Last Liquid: 1200  Date of Last Solid: 02/16/25  Time of Last Solid: 2000  Last Intake Type: Clear fluids         Physical Exam    Airway  Mallampati: I  TM distance: >3 FB  Neck ROM: full     Cardiovascular - normal exam     Dental - normal exam     Pulmonary - normal exam     Abdominal - normal exam         Anesthesia Plan    History of general anesthesia?: yes  History of complications of general anesthesia?: no    ASA 3     general     The patient is not a current smoker.  Patient was not previously instructed to abstain from smoking on day of procedure.  Patient did not smoke on day of procedure.    intravenous induction   Postoperative administration of opioids is intended.  Anesthetic plan and risks discussed with patient.  Use of blood products discussed with patient who.    Plan discussed with CRNA.

## 2025-02-18 NOTE — DISCHARGE INSTRUCTIONS
Post-Operative Instructions     Call if you have:  Vaginal bleeding soaking >2 pads per hour for 2 hours  Temperature greater than 100.4  Persistent nausea and vomiting  Severe uncontrolled pain  Difficulty breathing, headache or visual disturbances  Hives  Persistent dizziness or light-headedness  Extreme fatigue  Any other questions or concerns you may have after discharge    In an emergency, call 911 or go to an Emergency Department at a nearby hospital.

## 2025-02-18 NOTE — ANESTHESIA POSTPROCEDURE EVALUATION
Patient: Flaquita Watson    Procedure Summary       Date: 02/18/25 Room / Location: [facility] OR 03 / Virtual [facility] OR    Anesthesia Start: 1216 Anesthesia Stop: 1314    Procedure: DILATION AND CURETTAGE, Mirena IUD placement Diagnosis:       Endometrial cancer (Multi)      (Endometrial cancer (Multi) [C54.1])    Surgeons: Ksenia Marrufo MD MPH Responsible Provider: Boom William MD    Anesthesia Type: general ASA Status: 3            Anesthesia Type: general    Vitals Value Taken Time   /86 02/18/25 1314   Temp 36.1 02/18/25 1314   Pulse 86 02/18/25 1314   Resp 20 02/18/25 1314   SpO2 100 02/18/25 1314       Anesthesia Post Evaluation    Patient location during evaluation: bedside  Patient participation: complete - patient participated  Level of consciousness: awake  Pain score: 0  Pain management: adequate  Airway patency: patent  Cardiovascular status: acceptable  Respiratory status: acceptable, spontaneous ventilation and face mask  Hydration status: acceptable  Postoperative Nausea and Vomiting: none        There were no known notable events for this encounter.

## 2025-02-18 NOTE — OP NOTE
DILATION AND CURETTAGE, Mirena IUD placement Operative Note     Date: 2025  OR Location: [facility] OR    Name: Flaquita Watson, : 1955, Age: 69 y.o., MRN: [id number], Sex: female    Diagnosis  Pre-op Diagnosis      * Endometrial cancer (Multi) [C54.1] Post-op Diagnosis     * Endometrial cancer (Multi) [C54.1]     Procedures  DILATION AND CURETTAGE, Mirena IUD placement  39000 - [place] DILATION & CURETTAGE DX&/THER NONOBSTETRIC      Surgeons      * Ksenia Marrufo - Primary    Resident/Fellow/Other Assistant:  Surgeons and Role:     * Silverio Riddle MD - Resident - Assisting    Staff:   Circulator: Lobar  Scrub Person: Charlie Gleason Circulator: Antonio Gleason Scrub: La    Anesthesia Staff: Anesthesiologist: Boom William MD  CRNA: VANESSA Henderson-CRNA  SRNA: Ana Suresh    Procedure Summary  Anesthesia: General  ASA: III  Estimated Blood Loss: 5mL  Intra-op Medications:   Administrations occurring from 1050 to 1210 on 25:   Medication Name Total Dose   acetaminophen (Tylenol) tablet 975 mg 975 mg              Anesthesia Record               Intraprocedure I/O Totals          Intake    lactated Ringer's 400.00 mL    Total Intake 400 mL       Output    Total Blood Loss - Surgical Delivery (mL) 5 mL    Total Output 5 mL       Net    Net Volume 395 mL       Other (could not be determined as input or output)    Surgical Delivery Estimated Blood Loss (mL) 5          Specimen:   ID Type Source Tests Collected by Time   1 : [facility] Tissue ENDOMETRIUM CURETTINGS SURGICAL PATHOLOGY EXAM Ksenia Marrufo MD MPH 2025 1247                 Drains and/or Catheters: * None in log *    Tourniquet Times:         Implants:  Implants       Type Name Action Serial No.      OB/GYN Implant MIRENA Implanted [id number]              Findings: 7 cm AV uterus. Scant tissue from the uterus. Cervix normal. Mirena IUD placed without difficulty Exp  Lot # [id number].    Indications: Flaquita Watson is an 69 y.o. female who is  having surgery for Endometrial cancer (Multi) [C54.1].     The patient was seen in the preoperative area. The risks, benefits, complications, treatment options, non-operative alternatives, expected recovery and outcomes were discussed with the patient. The possibilities of reaction to medication, pulmonary aspiration, injury to surrounding structures, bleeding, recurrent infection, the need for additional procedures, failure to diagnose a condition, and creating a complication requiring transfusion or operation were discussed with the patient. The patient concurred with the proposed plan, giving informed consent.  The site of surgery was properly noted/marked if necessary per policy. The patient has been actively warmed in preoperative area. Preoperative antibiotics are not indicated. Venous thrombosis prophylaxis have been ordered including bilateral sequential compression devices    Procedure Details:   The patient was taken to the operating room where anesthesia was found to be adequate. She was placed in the dorsal lithotomy position and prepped and draped in the usual sterile fashion. Next, a speculum was placed in the vagina. The anterior lip of the cervix was grasped with a single-toothed tenaculum. The cervix was dilated and a sound was passed to confirm 7 cm uterus. Sharp curette was performed to gritty texture.  Mirena IUD was then placed without difficulty. All instruments were removed from the vaginal vault and hemostasis was again confirmed.     All counts were correct. The specimen was sent to pathology.  The patient was taken from the operating room in stable condition after reversal of anesthesia to a recovery room      Complications:  None; patient tolerated the procedure well.    Disposition: PACU - hemodynamically stable.  Condition: stable       Attending Attestation: I was present and scrubbed for the entire procedure.    Ksenia SPEAR [facility]  Phone Number: [phone]

## 2025-02-18 NOTE — LETTER
February 18, 2025     Patient: Flaquita Watson   YOB: 1955   Date of Visit: 1/10/2025       To Whom It May Concern:    It is my medical opinion that Flaquita Watson may return to work on 2/24/25 .    If you have any questions or concerns, please don't hesitate to call.         Sincerely,      Ksenia Marrufo MD MPH

## 2025-02-20 ENCOUNTER — APPOINTMENT (OUTPATIENT)
Dept: NEPHROLOGY | Facility: CLINIC | Age: 70
End: 2025-02-20
Payer: MEDICARE

## 2025-02-26 LAB
LABORATORY COMMENT REPORT: NORMAL
PATH REPORT.COMMENTS IMP SPEC: NORMAL
PATH REPORT.FINAL DX SPEC: NORMAL
PATH REPORT.GROSS SPEC: NORMAL
PATH REPORT.RELEVANT HX SPEC: NORMAL
PATH REPORT.TOTAL CANCER: NORMAL
RESIDENT REVIEW: NORMAL

## 2025-03-06 ENCOUNTER — OFFICE VISIT (OUTPATIENT)
Dept: GYNECOLOGIC ONCOLOGY | Facility: HOSPITAL | Age: 70
End: 2025-03-06
Payer: MEDICARE

## 2025-03-06 VITALS
TEMPERATURE: 97 F | DIASTOLIC BLOOD PRESSURE: 100 MMHG | OXYGEN SATURATION: 98 % | WEIGHT: 293 LBS | SYSTOLIC BLOOD PRESSURE: 167 MMHG | BODY MASS INDEX: 57.75 KG/M2 | HEART RATE: 100 BPM | RESPIRATION RATE: 18 BRPM

## 2025-03-06 DIAGNOSIS — E66.813 CLASS 3 SEVERE OBESITY WITH SERIOUS COMORBIDITY AND BODY MASS INDEX (BMI) OF 60.0 TO 69.9 IN ADULT, UNSPECIFIED OBESITY TYPE: ICD-10-CM

## 2025-03-06 DIAGNOSIS — Z98.890 POST-OPERATIVE STATE: ICD-10-CM

## 2025-03-06 DIAGNOSIS — I10 HYPERTENSION, UNSPECIFIED TYPE: ICD-10-CM

## 2025-03-06 DIAGNOSIS — E66.01 CLASS 3 SEVERE OBESITY WITH SERIOUS COMORBIDITY AND BODY MASS INDEX (BMI) OF 60.0 TO 69.9 IN ADULT, UNSPECIFIED OBESITY TYPE: ICD-10-CM

## 2025-03-06 DIAGNOSIS — C54.1 ENDOMETRIAL CANCER (MULTI): Primary | ICD-10-CM

## 2025-03-06 PROCEDURE — 1126F AMNT PAIN NOTED NONE PRSNT: CPT | Performed by: STUDENT IN AN ORGANIZED HEALTH CARE EDUCATION/TRAINING PROGRAM

## 2025-03-06 PROCEDURE — 3077F SYST BP >= 140 MM HG: CPT | Performed by: STUDENT IN AN ORGANIZED HEALTH CARE EDUCATION/TRAINING PROGRAM

## 2025-03-06 PROCEDURE — 3080F DIAST BP >= 90 MM HG: CPT | Performed by: STUDENT IN AN ORGANIZED HEALTH CARE EDUCATION/TRAINING PROGRAM

## 2025-03-06 PROCEDURE — 99211 OFF/OP EST MAY X REQ PHY/QHP: CPT | Performed by: STUDENT IN AN ORGANIZED HEALTH CARE EDUCATION/TRAINING PROGRAM

## 2025-03-06 ASSESSMENT — PAIN SCALES - GENERAL: PAINLEVEL_OUTOF10: 0-NO PAIN

## 2025-03-06 NOTE — PROGRESS NOTES
Patient ID: Flaquita Watson is a 70 y.o. female.  Referring Physician: No referring provider defined for this encounter.  Primary Care Provider: MITRA Khoury    Subjective    Here for post-op visit s/p D&C with LNG-IUD placement. She is currently on megace 80 BID. Increased PO intake. Mild weight gain. Anxious regarding results. Ongoing vaginal bleeding with rare passage of clots.     Objective    BSA: 2.56 meters squared  BP (!) 167/100 (BP Location: Right arm, Patient Position: Sitting, BP Cuff Size: Large adult)   Pulse 100   Temp 36.1 °C (97 °F)   Resp 18   Wt 148 kg (326 lb)   SpO2 98%   BMI 57.75 kg/m²      Physical Exam  Vitals and nursing note reviewed. Exam conducted with a chaperone present.   Constitutional:       General: She is not in acute distress.     Appearance: Normal appearance.   HENT:      Head: Normocephalic and atraumatic.      Mouth/Throat:      Mouth: Mucous membranes are moist.      Pharynx: No oropharyngeal exudate or posterior oropharyngeal erythema.   Eyes:      Extraocular Movements: Extraocular movements intact.      Conjunctiva/sclera: Conjunctivae normal.      Pupils: Pupils are equal, round, and reactive to light.   Neck:      Thyroid: No thyroid mass or thyromegaly.   Cardiovascular:      Rate and Rhythm: Normal rate and regular rhythm.      Pulses: Normal pulses.      Heart sounds: Normal heart sounds.   Pulmonary:      Effort: Pulmonary effort is normal.      Breath sounds: Normal breath sounds. No wheezing, rhonchi or rales.   Abdominal:      General: Bowel sounds are normal. There is no distension.      Palpations: Abdomen is soft. There is no mass.      Tenderness: There is no abdominal tenderness.      Hernia: No hernia is present.   Musculoskeletal:         General: No tenderness. Normal range of motion.      Cervical back: Normal range of motion and neck supple. No tenderness.      Right lower leg: No edema.      Left lower leg: No edema.   Skin:      General: Skin is warm.      Findings: No lesion or rash.   Neurological:      General: No focal deficit present.      Mental Status: She is alert and oriented to person, place, and time.   Psychiatric:         Mood and Affect: Mood normal.         Behavior: Behavior normal.       FINAL DIAGNOSIS   A. Endometrium, dilatation and curettage:  - Hyperplasia with treatment effect. See comment.        Performance Status:  Asymptomatic    Assessment/Plan   70 y.o. with Endometrial adenocarcinoma, endometrioid type, FIGO grade 1 p53wt MMRd MLH1 hypermethylation  Comorbidities: obesity (BMI 60), splenic and renal artery aneurysm    Oncology History Overview Note   9/6/24 EMBx Endometrial adenocarcinoma, endometrioid type, FIGO grade 1 p53wt MMRd MLH1 hypermethylation     Endometrial cancer (Multi)   9/27/2024 Initial Diagnosis    Endometrial cancer (Multi)          Diagnoses and all orders for this visit:  Endometrial cancer  - Discussed the significance of histologic subtype, grade and stage  - Reviewed pelvic MRI with uterine confined disease, myometrial invasion noted   - Pathology reviewed, no cancer but continues with continued hyperplasia but evidence of treatment effect  - Continue PO megace 80 BID and LNG-IUD   - Recommend visits q3-6 months   - Repeat D&C in the OR 6 months from now    Obesity, class 3  - Weight gain, declines referral to nutrition and bariatric medicine     HTN   - PCP follow up    Renal and splenic artery aneurysms  - Planning to follow with CT    RTC 3 months in person    Ksenia Marrufo MD MPH

## 2025-04-07 ENCOUNTER — DOCUMENTATION (OUTPATIENT)
Dept: PHYSICAL THERAPY | Facility: CLINIC | Age: 70
End: 2025-04-07
Payer: MEDICARE

## 2025-04-07 NOTE — PROGRESS NOTES
Physical Therapy    Discharge Summary    Name: Flaquita Watson  MRN: [id number]  : 1955  Date: 25    Discharge Summary: PT    Discharge Information: Date of discharge 2025, Date of last visit 2025, Date of evaluation 2025, Number of attended visits 1, Referred by Marleni Espinoza, and Referred for radiculopathy of lumbar spine region    Therapy Summary: Patient evaluated and participated with POC development. No follow ups scheduled    Discharge Status: NA     Rehab Discharge Reason: chart inactive, without attempts to schedule follow ups

## 2025-04-07 NOTE — PROGRESS NOTES
Patient ID: Flaquita Watson is a 70 y.o. female.  Referring Physician: No referring provider defined for this encounter.  Primary Care Provider: MITRA Khoury    Subjective    Here for post-op visit s/p D&C with LNG-IUD placement. She is currently on megace 80 BID. Increased PO intake. Mild weight gain. Anxious regarding results. Ongoing vaginal bleeding with rare passage of clots.     Objective    BSA: There is no height or weight on file to calculate BSA.  There were no vitals taken for this visit.     Physical Exam  Vitals and nursing note reviewed. Exam conducted with a chaperone present.   Constitutional:       General: She is not in acute distress.     Appearance: Normal appearance.   HENT:      Head: Normocephalic and atraumatic.      Mouth/Throat:      Mouth: Mucous membranes are moist.      Pharynx: No oropharyngeal exudate or posterior oropharyngeal erythema.   Eyes:      Extraocular Movements: Extraocular movements intact.      Conjunctiva/sclera: Conjunctivae normal.      Pupils: Pupils are equal, round, and reactive to light.   Neck:      Thyroid: No thyroid mass or thyromegaly.   Cardiovascular:      Rate and Rhythm: Normal rate and regular rhythm.      Pulses: Normal pulses.      Heart sounds: Normal heart sounds.   Pulmonary:      Effort: Pulmonary effort is normal.      Breath sounds: Normal breath sounds. No wheezing, rhonchi or rales.   Abdominal:      General: Bowel sounds are normal. There is no distension.      Palpations: Abdomen is soft. There is no mass.      Tenderness: There is no abdominal tenderness.      Hernia: No hernia is present.   Musculoskeletal:         General: No tenderness. Normal range of motion.      Cervical back: Normal range of motion and neck supple. No tenderness.      Right lower leg: No edema.      Left lower leg: No edema.   Skin:     General: Skin is warm.      Findings: No lesion or rash.   Neurological:      General: No focal deficit present.      Mental  Status: She is alert and oriented to person, place, and time.   Psychiatric:         Mood and Affect: Mood normal.         Behavior: Behavior normal.       FINAL DIAGNOSIS   A. Endometrium, dilatation and curettage:  - Hyperplasia with treatment effect. See comment.        Performance Status:  Asymptomatic    Assessment/Plan   70 y.o. with Endometrial adenocarcinoma, endometrioid type, FIGO grade 1 p53wt MMRd MLH1 hypermethylation  Comorbidities: obesity (BMI 60), splenic and renal artery aneurysm    Oncology History Overview Note   9/6/24 EMBx Endometrial adenocarcinoma, endometrioid type, FIGO grade 1 p53wt MMRd MLH1 hypermethylation  2/18/25 D&C hyperplasia, no residual carcinoma noted     Endometrial cancer (Multi)   9/27/2024 Initial Diagnosis    Endometrial cancer (Multi)          Diagnoses and all orders for this visit:  Endometrial cancer  - Discussed the significance of histologic subtype, grade and stage  - Reviewed pelvic MRI with uterine confined disease, myometrial invasion noted   - Pathology reviewed, no cancer but continues with continued hyperplasia but evidence of treatment effect  - Continue PO megace 80 BID and LNG-IUD   - Recommend visits q3-6 months   - Repeat D&C in the OR 6 months from now    Obesity, class 3  - Weight gain, declines referral to nutrition and bariatric medicine     HTN   - PCP follow up    Renal and splenic artery aneurysms  - Planning to follow with CT    RTC 3 months in person    Ksenia Marrufo MD MPH

## 2025-04-10 ENCOUNTER — OFFICE VISIT (OUTPATIENT)
Dept: GYNECOLOGIC ONCOLOGY | Facility: HOSPITAL | Age: 70
End: 2025-04-10
Payer: MEDICARE

## 2025-04-10 ENCOUNTER — APPOINTMENT (OUTPATIENT)
Dept: GYNECOLOGIC ONCOLOGY | Facility: HOSPITAL | Age: 70
End: 2025-04-10
Payer: MEDICARE

## 2025-04-10 VITALS
TEMPERATURE: 97.2 F | HEART RATE: 81 BPM | WEIGHT: 293 LBS | RESPIRATION RATE: 17 BRPM | DIASTOLIC BLOOD PRESSURE: 84 MMHG | SYSTOLIC BLOOD PRESSURE: 139 MMHG | BODY MASS INDEX: 57.22 KG/M2

## 2025-04-10 DIAGNOSIS — T83.32XA INTRAUTERINE CONTRACEPTIVE DEVICE THREADS LOST, INITIAL ENCOUNTER: ICD-10-CM

## 2025-04-10 DIAGNOSIS — N93.9 VAGINAL BLEEDING: ICD-10-CM

## 2025-04-10 DIAGNOSIS — N39.41 URGE INCONTINENCE: ICD-10-CM

## 2025-04-10 DIAGNOSIS — R53.83 OTHER FATIGUE: ICD-10-CM

## 2025-04-10 DIAGNOSIS — C54.1 ENDOMETRIAL CANCER (MULTI): Primary | ICD-10-CM

## 2025-04-10 PROCEDURE — 1126F AMNT PAIN NOTED NONE PRSNT: CPT | Performed by: STUDENT IN AN ORGANIZED HEALTH CARE EDUCATION/TRAINING PROGRAM

## 2025-04-10 PROCEDURE — 99214 OFFICE O/P EST MOD 30 MIN: CPT | Performed by: STUDENT IN AN ORGANIZED HEALTH CARE EDUCATION/TRAINING PROGRAM

## 2025-04-10 PROCEDURE — 1159F MED LIST DOCD IN RCRD: CPT | Performed by: STUDENT IN AN ORGANIZED HEALTH CARE EDUCATION/TRAINING PROGRAM

## 2025-04-10 PROCEDURE — 1160F RVW MEDS BY RX/DR IN RCRD: CPT | Performed by: STUDENT IN AN ORGANIZED HEALTH CARE EDUCATION/TRAINING PROGRAM

## 2025-04-10 RX ORDER — MEGESTROL ACETATE 40 MG/1
80 TABLET ORAL 2 TIMES DAILY
Qty: 240 TABLET | Refills: 5 | Status: SHIPPED | OUTPATIENT
Start: 2025-04-10 | End: 2026-04-10

## 2025-04-10 ASSESSMENT — PAIN SCALES - GENERAL: PAINLEVEL_OUTOF10: 0-NO PAIN

## 2025-04-10 NOTE — PROGRESS NOTES
Patient ID: Flaquita Watson is a 70 y.o. female.  Referring Physician: No referring provider defined for this encounter.  Primary Care Provider: MITRA Khoury    Subjective    Here for problem visit. She reports ongoing bleeding.Stopped taking megace. Passage of clots. Has not seen IUD fall out. Fatigue worse. Trying to cut back pepsi but continues at work. Weight stable. Difficult time with recent passing of her mom and dad with health concerns.   Urge predominant incontinence, large volume disrupting quality of life.     Objective    BSA: 2.56 meters squared  /84 (BP Location: Right arm, Patient Position: Sitting, BP Cuff Size: Large adult)   Pulse 81   Temp 36.2 °C (97.2 °F)   Resp 17   Wt 147 kg (323 lb)   BMI 57.22 kg/m²      Physical Exam  Vitals and nursing note reviewed. Exam conducted with a chaperone present.   Constitutional:       General: She is not in acute distress.     Appearance: Normal appearance.   HENT:      Head: Normocephalic and atraumatic.      Mouth/Throat:      Mouth: Mucous membranes are moist.      Pharynx: No oropharyngeal exudate or posterior oropharyngeal erythema.   Eyes:      Extraocular Movements: Extraocular movements intact.      Conjunctiva/sclera: Conjunctivae normal.      Pupils: Pupils are equal, round, and reactive to light.   Neck:      Thyroid: No thyroid mass or thyromegaly.   Cardiovascular:      Rate and Rhythm: Normal rate and regular rhythm.      Pulses: Normal pulses.      Heart sounds: Normal heart sounds.   Pulmonary:      Effort: Pulmonary effort is normal.      Breath sounds: Normal breath sounds. No wheezing, rhonchi or rales.   Abdominal:      General: Bowel sounds are normal. There is no distension.      Palpations: Abdomen is soft. There is no mass.      Tenderness: There is no abdominal tenderness.      Hernia: No hernia is present.   Genitourinary:     Comments: Vulva - normal  Vagina - dark brown blood in the vault, cervix normal, no  strings visualized from the os  Musculoskeletal:         General: No tenderness. Normal range of motion.      Cervical back: Normal range of motion and neck supple. No tenderness.      Right lower leg: No edema.      Left lower leg: No edema.   Skin:     General: Skin is warm.      Findings: No lesion or rash.   Neurological:      General: No focal deficit present.      Mental Status: She is alert and oriented to person, place, and time.   Psychiatric:         Mood and Affect: Mood normal.         Behavior: Behavior normal.       FINAL DIAGNOSIS   A. Endometrium, dilatation and curettage:  - Hyperplasia with treatment effect. See comment.        Performance Status:  Asymptomatic    Assessment/Plan   70 y.o. with Endometrial adenocarcinoma, endometrioid type, FIGO grade 1 p53wt MMRd MLH1 hypermethylation s/p recent D&C with LNG-IUD with residual hyperplasia here with ongoing bleeding  Comorbidities: obesity (BMI 57), splenic and renal artery aneurysm    Oncology History Overview Note   9/6/24 EMBx Endometrial adenocarcinoma, endometrioid type, FIGO grade 1 p53wt MMRd MLH1 hypermethylation  2/18/25 D&C hyperplasia, no residual carcinoma noted     Endometrial cancer (Multi)   9/27/2024 Initial Diagnosis    Endometrial cancer (Multi)          Diagnoses and all orders for this visit:  Endometrial cancer  - Discussed the significance of histologic subtype, grade and stage  - Reviewed pelvic MRI with uterine confined disease, myometrial invasion noted   - Pathology reviewed, no cancer but continues with continued hyperplasia but evidence of treatment effect  - Restart PO megace 80 BID   - Pelvic US to evaluate LNG-IUD since strings are not visualized  - Recommend visits q3-6 months   - Repeat D&C in the OR 6 months from now    Fatigue, etiology unknown    Vaginal bleeding  - unclear etiology though concern for IUD given strings not visualized    Urinary incontinence, urge   - referral to urogynecology   - Discussed  behavioral modifications including weight loss, pelvic floor exercises    Obesity, class 3  - Weight gain, declines referral to nutrition and bariatric medicine     HTN   - PCP follow up    Renal and splenic artery aneurysms  - Planning to follow with CT    RTC TBD pending US    Ksenia Marrufo MD MPH

## 2025-04-15 ENCOUNTER — APPOINTMENT (OUTPATIENT)
Dept: RADIOLOGY | Facility: HOSPITAL | Age: 70
End: 2025-04-15
Payer: MEDICARE

## 2025-04-18 ENCOUNTER — HOSPITAL ENCOUNTER (OUTPATIENT)
Dept: RADIOLOGY | Facility: CLINIC | Age: 70
Discharge: HOME | End: 2025-04-18
Payer: MEDICARE

## 2025-04-18 DIAGNOSIS — C54.1 ENDOMETRIAL CANCER (MULTI): ICD-10-CM

## 2025-04-18 PROCEDURE — 76856 US EXAM PELVIC COMPLETE: CPT

## 2025-05-12 ENCOUNTER — APPOINTMENT (OUTPATIENT)
Dept: PRIMARY CARE | Facility: CLINIC | Age: 70
End: 2025-05-12
Payer: MEDICARE

## 2025-05-12 VITALS
WEIGHT: 293 LBS | HEART RATE: 100 BPM | DIASTOLIC BLOOD PRESSURE: 64 MMHG | HEIGHT: 63 IN | OXYGEN SATURATION: 98 % | SYSTOLIC BLOOD PRESSURE: 130 MMHG | BODY MASS INDEX: 51.91 KG/M2

## 2025-05-12 DIAGNOSIS — Z12.31 SCREENING MAMMOGRAM FOR BREAST CANCER: ICD-10-CM

## 2025-05-12 DIAGNOSIS — E66.813 CLASS 3 SEVERE OBESITY WITH SERIOUS COMORBIDITY AND BODY MASS INDEX (BMI) OF 60.0 TO 69.9 IN ADULT, UNSPECIFIED OBESITY TYPE: ICD-10-CM

## 2025-05-12 DIAGNOSIS — R53.82 CHRONIC FATIGUE: ICD-10-CM

## 2025-05-12 DIAGNOSIS — M48.062 SPINAL STENOSIS OF LUMBAR REGION WITH NEUROGENIC CLAUDICATION: ICD-10-CM

## 2025-05-12 DIAGNOSIS — M76.899 HAMSTRING TENDONITIS: ICD-10-CM

## 2025-05-12 DIAGNOSIS — R73.03 PREDIABETES: ICD-10-CM

## 2025-05-12 DIAGNOSIS — E78.2 MIXED HYPERLIPIDEMIA: ICD-10-CM

## 2025-05-12 DIAGNOSIS — I10 HYPERTENSION, UNSPECIFIED TYPE: Primary | ICD-10-CM

## 2025-05-12 DIAGNOSIS — E55.9 VITAMIN D DEFICIENCY: ICD-10-CM

## 2025-05-12 PROCEDURE — 1123F ACP DISCUSS/DSCN MKR DOCD: CPT

## 2025-05-12 PROCEDURE — 1160F RVW MEDS BY RX/DR IN RCRD: CPT

## 2025-05-12 PROCEDURE — 1036F TOBACCO NON-USER: CPT

## 2025-05-12 PROCEDURE — 3008F BODY MASS INDEX DOCD: CPT

## 2025-05-12 PROCEDURE — 1158F ADVNC CARE PLAN TLK DOCD: CPT

## 2025-05-12 PROCEDURE — G2211 COMPLEX E/M VISIT ADD ON: HCPCS

## 2025-05-12 PROCEDURE — 99214 OFFICE O/P EST MOD 30 MIN: CPT

## 2025-05-12 PROCEDURE — 3078F DIAST BP <80 MM HG: CPT

## 2025-05-12 PROCEDURE — 1159F MED LIST DOCD IN RCRD: CPT

## 2025-05-12 PROCEDURE — 3074F SYST BP LT 130 MM HG: CPT

## 2025-05-12 ASSESSMENT — PATIENT HEALTH QUESTIONNAIRE - PHQ9
2. FEELING DOWN, DEPRESSED OR HOPELESS: NOT AT ALL
2. FEELING DOWN, DEPRESSED OR HOPELESS: SEVERAL DAYS
10. IF YOU CHECKED OFF ANY PROBLEMS, HOW DIFFICULT HAVE THESE PROBLEMS MADE IT FOR YOU TO DO YOUR WORK, TAKE CARE OF THINGS AT HOME, OR GET ALONG WITH OTHER PEOPLE: NOT DIFFICULT AT ALL
1. LITTLE INTEREST OR PLEASURE IN DOING THINGS: NOT AT ALL
1. LITTLE INTEREST OR PLEASURE IN DOING THINGS: NOT AT ALL
SUM OF ALL RESPONSES TO PHQ9 QUESTIONS 1 AND 2: 0
SUM OF ALL RESPONSES TO PHQ9 QUESTIONS 1 AND 2: 1

## 2025-05-12 ASSESSMENT — ENCOUNTER SYMPTOMS
OCCASIONAL FEELINGS OF UNSTEADINESS: 0
DEPRESSION: 0
LOSS OF SENSATION IN FEET: 0

## 2025-05-12 NOTE — PROGRESS NOTES
"Primary Care Provider: Marleni Espinoza, APRN-CNP    Subjective   Flaquita Watson is a 70 y.o. female who presents for Follow-up (Fell hit head still sore. ).    HPI     FUV    HTN  Stable   No meds  Was high at office visit in March 2025  Not checking at home  BP today by machine is 121/64; recheck by me was     Class 3 severe obesity  5/12/2025: weight 327lbs, BMI 57.93  Bariatrics? Patient declines  Nutritionist? Patient declines  Weight loss medication? Patient declines  Diet: eating more with holidays and soda  Exercise: Hard to exercise with her orthopedic pain and chronic back pain; uses Rolator to walk due to this occasionally    MRI pelvis on 10/2024  Degenerative change in the visualized lower lumbar spine. Spinal canal stenosis lower lumbar spine. Bilateral hamstrings tendinosis with low-grade partial tearing    Had a fall earlier this year after sitting on the toilet for a prolong period of time and had numbness in her leg and get up and fell    Hx of vitamin d def  Patient states she was on a once weekly vitamin d supplement for this at one point and has been several years since she has been on this  Has chronic fatigue    Mixed hyperlipidemia  Declines medications  Lab Results   Component Value Date    CHOL 207 (H) 06/21/2024     Lab Results   Component Value Date    HDL 43.3 06/21/2024     Prediabetes  Drinking pepsi still  Lab Results   Component Value Date    HGBA1C 5.8 (H) 06/21/2024     No chest pain, no shortness of breath, bowel movements regular, no trouble urinating, energy level is good    Mammogram due 7/2025    Healthcare POA- daughter, Kajal Watson              Review of Systems  The remainder of the ROS was negative unless otherwise stated in the HPI.     Objective   /64   Pulse 100   Ht 1.6 m (5' 3\")   Wt 148 kg (327 lb)   SpO2 98%   BMI 57.93 kg/m²     Physical Exam  Vitals reviewed.   Constitutional:       General: She is not in acute distress.     Appearance: Normal " appearance. She is normal weight. She is not ill-appearing, toxic-appearing or diaphoretic.   HENT:      Head: Normocephalic and atraumatic.   Eyes:      Conjunctiva/sclera: Conjunctivae normal.   Cardiovascular:      Rate and Rhythm: Normal rate and regular rhythm.      Pulses: Normal pulses.      Heart sounds: Normal heart sounds. No murmur heard.     No friction rub. No gallop.   Pulmonary:      Effort: Pulmonary effort is normal. No respiratory distress.      Breath sounds: Normal breath sounds.   Abdominal:      General: Abdomen is flat. Bowel sounds are normal.      Palpations: Abdomen is soft.   Musculoskeletal:      Cervical back: Normal range of motion and neck supple.      Lumbar back: Decreased range of motion. Negative right straight leg raise test and negative left straight leg raise test.   Lymphadenopathy:      Cervical: No cervical adenopathy.   Skin:     General: Skin is warm and dry.   Neurological:      General: No focal deficit present.      Mental Status: She is alert and oriented to person, place, and time. Mental status is at baseline.   Psychiatric:         Mood and Affect: Mood normal.         Behavior: Behavior normal.         Thought Content: Thought content normal.         Judgment: Judgment normal.       Assessment/Plan   Problem List Items Addressed This Visit           ICD-10-CM    Mixed hyperlipidemia E78.2    Persisting  Declines medications  Lab Results   Component Value Date    CHOL 207 (H) 06/21/2024     Lab Results   Component Value Date    HDL 43.3 06/21/2024   Lifestyle changes encouraged  Relevant Orders    Hemoglobin A1C    Lipid Panel    Comprehensive metabolic panel    Hamstring tendonitis M76.899    Persisting  Hard to exercise with her orthopedic pain and chronic back pain; uses Rolator to walk due to this occasionally  MRI pelvis on 10/2024  Degenerative change in the visualized lower lumbar spine. Spinal canal stenosis lower lumbar spine. Bilateral hamstrings tendinosis  with low-grade partial tearing  Had a fall earlier this year after sitting on the toilet for a prolong period of time and had numbness in her leg and get up and fell  She declines sports medicine referral or medications at this time  Relevant Orders    Referral to Physical Therapy    Hypertension - Primary I10    Stable   No meds  Was high at office visit in March 2025  Not checking at home  BP today by machine is 121/64; recheck by me was 130/64  Low salt diet, continue working on weight loss  Relevant Orders    Hemoglobin A1C    Lipid Panel    Comprehensive metabolic panel    Prediabetes R73.03    Stable  Drinking pepsi still  Lab Results   Component Value Date    HGBA1C 5.8 (H) 06/21/2024   Encouraged lifestyle changes   Relevant Orders    Hemoglobin A1C    Lipid Panel    Comprehensive metabolic panel    Spinal stenosis of lumbar region with neurogenic claudication M48.062    Persisting  Hard to exercise with her orthopedic pain and chronic back pain; uses Rolator to walk due to this occasionally  MRI pelvis on 10/2024  Degenerative change in the visualized lower lumbar spine. Spinal canal stenosis lower lumbar spine. Bilateral hamstrings tendinosis with low-grade partial tearing  Had a fall earlier this year after sitting on the toilet for a prolong period of time and had numbness in her leg and get up and fell  She declines sports medicine referral or medications at this time  Relevant Orders    Referral to Physical Therapy    Vitamin D 25-Hydroxy,Total (for eval of Vitamin D levels)    Chronic fatigue R53.82    Persisting  Blood work- consider sleep study next  Relevant Orders    Vitamin D 25-Hydroxy,Total (for eval of Vitamin D levels)     Other Visit Diagnoses         Codes      Class 3 severe obesity with serious comorbidity and body mass index (BMI) of 60.0 to 69.9 in adult, unspecified obesity type     E66.813, Z68.44    Persisting  5/12/2025: weight 327lbs, BMI 57.93  Bariatrics? Patient  declines  Nutritionist? Patient declines  Weight loss medication? Patient declines  Diet: eating more with holidays and soda  Exercise: Hard to exercise with her orthopedic pain and chronic back pain; uses Rolator to walk due to this occasionally  Continue with weight loss efforts and lifestyle changes  Relevant Orders    Hemoglobin A1C    Lipid Panel    Comprehensive metabolic panel      Screening mammogram for breast cancer     Z12.31    Relevant Orders    BI mammo bilateral screening tomosynthesis      Vitamin D deficiency     E55.9    Recheck vit d levels  Hx of vitamin d def  Patient states she was on a once weekly vitamin d supplement for this at one point and has been several years since she has been on this  Has chronic fatigue  Relevant Orders    Vitamin D 25-Hydroxy,Total (for eval of Vitamin D levels)          Mammogram due 7/2025    Healthcare POA- daughter, Kajal Watson      Patient was identified as a fall risk. Risk prevention instructions provided.      Follow up in 3-4 months or sooner if needed

## 2025-05-12 NOTE — PATIENT INSTRUCTIONS

## 2025-05-20 LAB
25(OH)D3+25(OH)D2 SERPL-MCNC: 19 NG/ML (ref 30–100)
ALBUMIN SERPL-MCNC: 3.6 G/DL (ref 3.6–5.1)
ALP SERPL-CCNC: 88 U/L (ref 37–153)
ALT SERPL-CCNC: 8 U/L (ref 6–29)
ANION GAP SERPL CALCULATED.4IONS-SCNC: 9 MMOL/L (CALC) (ref 7–17)
AST SERPL-CCNC: 11 U/L (ref 10–35)
BILIRUB SERPL-MCNC: 0.6 MG/DL (ref 0.2–1.2)
BUN SERPL-MCNC: 15 MG/DL (ref 7–25)
CALCIUM SERPL-MCNC: 9.1 MG/DL (ref 8.6–10.4)
CHLORIDE SERPL-SCNC: 111 MMOL/L (ref 98–110)
CHOLEST SERPL-MCNC: 188 MG/DL
CHOLEST/HDLC SERPL: 4.6 (CALC)
CO2 SERPL-SCNC: 22 MMOL/L (ref 20–32)
CREAT SERPL-MCNC: 0.77 MG/DL (ref 0.5–1.05)
EGFRCR SERPLBLD CKD-EPI 2021: 84 ML/MIN/1.73M2
EST. AVERAGE GLUCOSE BLD GHB EST-MCNC: 120 MG/DL
EST. AVERAGE GLUCOSE BLD GHB EST-SCNC: 6.6 MMOL/L
GLUCOSE SERPL-MCNC: 102 MG/DL (ref 65–99)
HBA1C MFR BLD: 5.8 %
HDLC SERPL-MCNC: 41 MG/DL
LDLC SERPL CALC-MCNC: 131 MG/DL (CALC)
NONHDLC SERPL-MCNC: 147 MG/DL (CALC)
POTASSIUM SERPL-SCNC: 4.4 MMOL/L (ref 3.5–5.3)
PROT SERPL-MCNC: 6.8 G/DL (ref 6.1–8.1)
SODIUM SERPL-SCNC: 142 MMOL/L (ref 135–146)
TRIGL SERPL-MCNC: 66 MG/DL

## 2025-06-02 ENCOUNTER — PATIENT MESSAGE (OUTPATIENT)
Dept: PRIMARY CARE | Facility: CLINIC | Age: 70
End: 2025-06-02
Payer: MEDICARE

## 2025-06-12 ENCOUNTER — APPOINTMENT (OUTPATIENT)
Dept: GYNECOLOGIC ONCOLOGY | Facility: HOSPITAL | Age: 70
End: 2025-06-12
Payer: MEDICARE

## 2025-07-10 ENCOUNTER — OFFICE VISIT (OUTPATIENT)
Dept: GYNECOLOGIC ONCOLOGY | Facility: HOSPITAL | Age: 70
End: 2025-07-10
Payer: MEDICARE

## 2025-07-10 ENCOUNTER — PREP FOR PROCEDURE (OUTPATIENT)
Dept: OPERATING ROOM | Facility: HOSPITAL | Age: 70
End: 2025-07-10

## 2025-07-10 VITALS
DIASTOLIC BLOOD PRESSURE: 85 MMHG | WEIGHT: 293 LBS | OXYGEN SATURATION: 96 % | BODY MASS INDEX: 59.87 KG/M2 | RESPIRATION RATE: 18 BRPM | TEMPERATURE: 97.2 F | SYSTOLIC BLOOD PRESSURE: 142 MMHG | HEART RATE: 80 BPM

## 2025-07-10 DIAGNOSIS — C54.1 ENDOMETRIAL CANCER (MULTI): Primary | ICD-10-CM

## 2025-07-10 DIAGNOSIS — E66.813 CLASS 3 SEVERE OBESITY WITH SERIOUS COMORBIDITY AND BODY MASS INDEX (BMI) OF 60.0 TO 69.9 IN ADULT, UNSPECIFIED OBESITY TYPE: ICD-10-CM

## 2025-07-10 DIAGNOSIS — N39.41 URGE INCONTINENCE: ICD-10-CM

## 2025-07-10 PROCEDURE — 1160F RVW MEDS BY RX/DR IN RCRD: CPT | Performed by: STUDENT IN AN ORGANIZED HEALTH CARE EDUCATION/TRAINING PROGRAM

## 2025-07-10 PROCEDURE — 1159F MED LIST DOCD IN RCRD: CPT | Performed by: STUDENT IN AN ORGANIZED HEALTH CARE EDUCATION/TRAINING PROGRAM

## 2025-07-10 PROCEDURE — 3079F DIAST BP 80-89 MM HG: CPT | Performed by: STUDENT IN AN ORGANIZED HEALTH CARE EDUCATION/TRAINING PROGRAM

## 2025-07-10 PROCEDURE — 3077F SYST BP >= 140 MM HG: CPT | Performed by: STUDENT IN AN ORGANIZED HEALTH CARE EDUCATION/TRAINING PROGRAM

## 2025-07-10 PROCEDURE — 99214 OFFICE O/P EST MOD 30 MIN: CPT | Performed by: STUDENT IN AN ORGANIZED HEALTH CARE EDUCATION/TRAINING PROGRAM

## 2025-07-10 NOTE — PROGRESS NOTES
Patient ID: Flaquita Watson is a 70 y.o. female.  Referring Physician: No referring provider defined for this encounter.  Primary Care Provider: MITRA Khoury    Subjective    Here for follow up. Continues with LNG-IUD. No longer taking PO progesterone. Urge incontinence. Denies dysuria, hematuria. Weight going up. Feels as if she is retaining fluid. Attributes to diet. Seen by PCP who discussed weight loss strategies, declines. Bleeding has improved. Changing jobs.    Objective    BSA: 2.61 meters squared  /85 (BP Location: Right arm, Patient Position: Sitting, BP Cuff Size: Large adult)   Pulse 80   Temp 36.2 °C (97.2 °F)   Resp 18   Wt (!) 153 kg (338 lb)   SpO2 96%   BMI 59.87 kg/m²      Physical Exam  Vitals and nursing note reviewed. Exam conducted with a chaperone present.   Constitutional:       General: She is not in acute distress.     Appearance: Normal appearance.   HENT:      Head: Normocephalic and atraumatic.      Mouth/Throat:      Mouth: Mucous membranes are moist.      Pharynx: No oropharyngeal exudate or posterior oropharyngeal erythema.   Eyes:      Extraocular Movements: Extraocular movements intact.      Conjunctiva/sclera: Conjunctivae normal.      Pupils: Pupils are equal, round, and reactive to light.   Neck:      Thyroid: No thyroid mass or thyromegaly.   Cardiovascular:      Rate and Rhythm: Normal rate and regular rhythm.      Pulses: Normal pulses.      Heart sounds: Normal heart sounds.   Pulmonary:      Effort: Pulmonary effort is normal.      Breath sounds: Normal breath sounds. No wheezing, rhonchi or rales.   Abdominal:      General: Bowel sounds are normal. There is no distension.      Palpations: Abdomen is soft. There is no mass.      Tenderness: There is no abdominal tenderness.      Hernia: No hernia is present.   Musculoskeletal:         General: No tenderness. Normal range of motion.      Cervical back: Normal range of motion and neck supple. No  tenderness.      Right lower leg: No edema.      Left lower leg: No edema.   Skin:     General: Skin is warm.      Findings: No lesion or rash.   Neurological:      General: No focal deficit present.      Mental Status: She is alert and oriented to person, place, and time.   Psychiatric:         Mood and Affect: Mood normal.         Behavior: Behavior normal.       FINAL DIAGNOSIS   A. Endometrium, dilatation and curettage:  - Hyperplasia with treatment effect. See comment.        Performance Status:  Asymptomatic    Assessment/Plan   70 y.o. with Endometrial adenocarcinoma, endometrioid type, FIGO grade 1 p53wt MMRd MLH1 hypermethylation s/p recent D&C with LNG-IUD with residual hyperplasia here for follow up  Comorbidities: obesity (BMI 57), splenic and renal artery aneurysm    Oncology History Overview Note   9/6/24 EMBx Endometrial adenocarcinoma, endometrioid type, FIGO grade 1 p53wt MMRd MLH1 hypermethylation  2/18/25 D&C hyperplasia, no residual carcinoma noted     Endometrial cancer (Multi)   9/27/2024 Initial Diagnosis    Endometrial cancer (Multi)          Diagnoses and all orders for this visit:  Endometrial cancer  - Discussed the significance of histologic subtype, grade and stage  - Reviewed pelvic MRI with uterine confined disease, myometrial invasion noted   - Pathology reviewed, no cancer but continues with continued hyperplasia but evidence of treatment effect  - Recommend visits q3-6 months   - Repeat D&C in the OR 6 months, to be scheduled in September    Urinary incontinence, urge   - Referral to urogynecology   - Discussed behavioral modifications including weight loss, pelvic floor exercises    Obesity, class 3  - Weight gain, declines referral to nutrition and bariatric medicine     HTN   - PCP follow up    Renal and splenic artery aneurysms  - Planning to follow with CT    Ksenia Marrufo MD MPH

## 2025-07-23 ENCOUNTER — PATIENT MESSAGE (OUTPATIENT)
Dept: PRIMARY CARE | Facility: CLINIC | Age: 70
End: 2025-07-23
Payer: MEDICARE

## (undated) DEVICE — PREP TRAY, SKIN, DRY, W/GLOVES

## (undated) DEVICE — COVER, CART, 45 X 27 X 48 IN, CLEAR

## (undated) DEVICE — SPONGE, GAUZE, XRAY DECT, 16 PLY, 4 X 4, W/MASTER DMT,STERILE

## (undated) DEVICE — Device

## (undated) DEVICE — DRAPE, PAD, PREP, W/ 9 IN CUFF, 24 X 41, LF, NS

## (undated) DEVICE — COVER, LIGHT HANDLE, SURGICAL, FLEXIBLE, DISPOSABLE, STERILE

## (undated) DEVICE — MARKER, SKIN, RULER AND LABEL PACK, CUSTOM

## (undated) DEVICE — DRESSING, NON-ADHERENT, TELFA, OUCHLESS, 3 X 8 IN, STERILE

## (undated) DEVICE — COVER, TABLE, 44 X 75 IN, DISPOSABLE, LF, STERILE

## (undated) DEVICE — REST, HEAD, BAGEL, 9 IN

## (undated) DEVICE — CATHETER, URETHRAL, ROBNEL, 16 FR, 16 IN, LF, RED